# Patient Record
Sex: FEMALE | Race: WHITE
[De-identification: names, ages, dates, MRNs, and addresses within clinical notes are randomized per-mention and may not be internally consistent; named-entity substitution may affect disease eponyms.]

---

## 2020-01-10 ENCOUNTER — APPOINTMENT (OUTPATIENT)
Dept: FAMILY MEDICINE | Facility: CLINIC | Age: 65
End: 2020-01-10

## 2023-03-14 PROBLEM — Z00.00 ENCOUNTER FOR PREVENTIVE HEALTH EXAMINATION: Status: ACTIVE | Noted: 2023-03-14

## 2023-03-31 ENCOUNTER — APPOINTMENT (OUTPATIENT)
Dept: ORTHOPEDIC SURGERY | Facility: CLINIC | Age: 68
End: 2023-03-31
Payer: MEDICARE

## 2023-03-31 VITALS — HEIGHT: 63 IN | WEIGHT: 140 LBS | BODY MASS INDEX: 24.8 KG/M2

## 2023-03-31 DIAGNOSIS — Z78.9 OTHER SPECIFIED HEALTH STATUS: ICD-10-CM

## 2023-03-31 PROCEDURE — 99214 OFFICE O/P EST MOD 30 MIN: CPT

## 2023-03-31 NOTE — DISCUSSION/SUMMARY
[de-identified] : In regard to cervical spine:\par I am requesting an updated cervical spine MRI to evaluate for spinal cord compression, patient displaying signs consistent with myelopathy, last study is approx 1.5 yrs old. Discussed potential acdf.\par \par In regards to lumbar spine: \par Discussed and reviewed results of MRI study demonstrating spondylolisthesis L4/5 and moderate stenosis. Limited in terms of medications due to GI upset. Patient was provided with a referral for lumbar and LT knee physical therapy to work on stretching, strengthening and range of motion. I am referring the patient to pain management to discuss trying Lumbar Epidural Spine Injections for pain relief. \par \par Follow up after Cervical MRI is complete. \par \par Prior to appointment and during encounter with patient extensive medical records were reviewed including but not limited to, hospital records, outpatient records, imaging results, and lab data.During this appointment the patient was examined, diagnoses were discussed and explained in a face to face manner. In addition extensive time was spent reviewing aforementioned diagnostic studies. Counseling including abnormal image results, differential diagnoses, treatment options, risk and benefits, lifestyle changes, current condition, and current medications was performed. Patient's comments, questions, and concerns were addressed and patient verbalized understanding. Based on this patient's presentation at our office, which is an orthopedic spine surgeon's office, this patient inherently / intrinsically has a risk, however minute, of developing issues such as Cauda equina syndrome, bowel and bladder changes, or progression of motor or neurological deficits such as paralysis which may be permanent.\par \par NATTY FOX Acting as a Scribe for Dr. Bustamante\par I, Natty Fox, attest that this documentation has been prepared under the direction and in the presence of Provider Jerald Bustamante MD.

## 2023-03-31 NOTE — HISTORY OF PRESENT ILLNESS
[de-identified] : 3/31/23: The patient is a  67 year old, R dominant hand female who presents today for an eval of C spine, s/p ACDF C5-7 2015. Overall she has been doing well and relatively asymptomatic until 1 year ago. She reports onset of left UE radic starting 1 yr ago, no trauma or injury. She is not using nsaids due to GI distress. No right sided upper extremity symptoms, or change in UE strength. She reports progressive instability ( 2 falls). Dexterity is normal. Severity of pain not as severe compared to pre op, but states it has been progressive. \par Lumbar - also complaints of constant LLE radic present daily. She presents with MRI study from [facility]. Severity ranges. Pain is worse at night. \par \par \par  [FreeTextEntry5] : complaining of left neck pain\par Date of Injury/Onset: ~ 1 year ago\par Pain:    At Rest: 0/10  \par With Activity:  5/10  \par Mechanism of injury: Had C spine surgery back in 2015, felt well all this long \par Quality of symptoms: Sharp with certain movements,  uncomfortable, numbness, radiates down into the arms and hands\par This is NOT a Worker's Compensation Injury\par Improves with: None\par Worse with: Moving the neck forwarded down, laying down\par Prior treatment/ Imaging: MRI @ [facility]\par Out of work: ;Since: \par School/Sport/Position/Occupation: Retired\par Additional Information: 2 weeks ago had left knee Arthroscopic surgery [facility] in [facility].

## 2023-03-31 NOTE — DATA REVIEWED
[Lumbar Spine] : lumbar spine [MRI] : MRI [Cervical Spine] : cervical spine [Report was reviewed and noted in the chart] : The report was reviewed and noted in the chart [I independently reviewed and interpreted images and report] : I independently reviewed and interpreted images and report [I reviewed the films/CD and additionally noted] : I reviewed the films/CD and additionally noted [FreeTextEntry2] : 2021 Post op changes C5-7. Broad based hnp/stenosis C4-5 [FreeTextEntry1] : I stop paperwork reviewed

## 2023-03-31 NOTE — PHYSICAL EXAM
[NL (90)] : forward flexion 90 degrees [NL (30)] : right lateral rotation 30 degrees [NL (45)] : extension 45 degrees [NL (40)] : right lateral bending 40 degrees [5___] : right extensor hallicus longus 5[unfilled]/5 [de-identified] : Constitutional:\par - General Appearance:\par Unremarkable\par Body Habitus\par Well Developed\par Well Nourished\par Body Habitus\par No Deformities\par Well Groomed\par Ability To communicate:\par Normal\par Neurologic:\par Global sensation is intact to upper and lower extremities. See examination of Neck and/or Spine\par for exceptions.\par Orientation to Time, Place and Person is: Normal\par Mood And Affect is Normal\par Skin:\par - Head/Face, Right Upper/Lower Extremity, Left Upper/Lower Extremity: Normal\par See Examination of Neck and/or Spine for exceptions\par Cardiovascular:\par Peripheral Cardiovascular System is Normal\par Palpation of Lymph Nodes:\par Normal Palpation of lymph nodes in: Axilla, Cervical, Inguinal\par Abnormal Palpation of lymph nodes in: None  [] : non-antalgic

## 2023-04-03 ENCOUNTER — RESULT REVIEW (OUTPATIENT)
Age: 68
End: 2023-04-03

## 2023-04-21 ENCOUNTER — APPOINTMENT (OUTPATIENT)
Dept: ORTHOPEDIC SURGERY | Facility: CLINIC | Age: 68
End: 2023-04-21
Payer: MEDICARE

## 2023-04-21 VITALS — BODY MASS INDEX: 24.8 KG/M2 | WEIGHT: 140 LBS | HEIGHT: 63 IN

## 2023-04-21 PROCEDURE — 99214 OFFICE O/P EST MOD 30 MIN: CPT

## 2023-04-21 NOTE — DATA REVIEWED
[Lumbar Spine] : lumbar spine [FreeTextEntry2] : 2021 Post op changes C5-7. Broad based hnp/stenosis C4-5 [MRI] : MRI [Cervical Spine] : cervical spine [Report was reviewed and noted in the chart] : The report was reviewed and noted in the chart [I independently reviewed and interpreted images and report] : I independently reviewed and interpreted images and report [I reviewed the films/CD and additionally noted] : I reviewed the films/CD and additionally noted [FreeTextEntry1] : I stop paperwork reviewed

## 2023-04-21 NOTE — DISCUSSION/SUMMARY
[de-identified] : In regard to cervical spine:\par We discussed and reviewed MRI from 4/3/23 and pathology of her symptoms associated with C4/5 moderate/severe stenosis. Discussed possible interventional spine injections vs surgical decompression, however, we have mutually decided to continue conservative management at this time. Patient will follow up with Dr. Wren to discuss trying C4/5 MADI Spine Injection for pain relief - referral provided. Continue medical mgmt and physician superviused exercise based rehabilitation.\par \par In regard to lumbar spine:\par Patient was provided with a referral for lumbar physical therapy to work on stretching, strengthening and range of motion. Patient was provided with a lumbar home exercise program. Possible injections if no improvements from PT. \par \par Prior to appointment and during encounter with patient extensive medical records were reviewed including but not limited to, hospital records, outpatient records, imaging results, and lab data.During this appointment the patient was examined, diagnoses were discussed and explained in a face to face manner. In addition extensive time was spent reviewing aforementioned diagnostic studies. Counseling including abnormal image results, differential diagnoses, treatment options, risk and benefits, lifestyle changes, current condition, and current medications was performed. Patient's comments, questions, and concerns were addressed and patient verbalized understanding. Based on this patient's presentation at our office, which is an orthopedic spine surgeon's office, this patient inherently / intrinsically has a risk, however minute, of developing issues such as Cauda equina syndrome, bowel and bladder changes, or progression of motor or neurological deficits such as paralysis which may be permanent.\par \par NATTY FOX Acting as a Scribe for Dr. Bustamante\rico I, Natty Fox, attest that this documentation has been prepared under the direction and in the presence of Provider Jerald Bustamante MD.

## 2023-04-21 NOTE — PHYSICAL EXAM
[NL (90)] : forward flexion 90 degrees [NL (30)] : right lateral rotation 30 degrees [NL (45)] : extension 45 degrees [NL (40)] : right lateral bending 40 degrees [5___] : right extensor hallicus longus 5[unfilled]/5 [] : non-antalgic [de-identified] : Constitutional:\par - General Appearance:\par Unremarkable\par Body Habitus\par Well Developed\par Well Nourished\par Body Habitus\par No Deformities\par Well Groomed\par Ability To communicate:\par Normal\par Neurologic:\par Global sensation is intact to upper and lower extremities. See examination of Neck and/or Spine\par for exceptions.\par Orientation to Time, Place and Person is: Normal\par Mood And Affect is Normal\par Skin:\par - Head/Face, Right Upper/Lower Extremity, Left Upper/Lower Extremity: Normal\par See Examination of Neck and/or Spine for exceptions\par Cardiovascular:\par Peripheral Cardiovascular System is Normal\par Palpation of Lymph Nodes:\par Normal Palpation of lymph nodes in: Axilla, Cervical, Inguinal\par Abnormal Palpation of lymph nodes in: None

## 2023-04-21 NOTE — HISTORY OF PRESENT ILLNESS
[de-identified] : 4/21/23: Patient presenting for an MRI review of cervical spine. Patient reports severity of her pain is worse since last visit. Severity of her pain ranges day to day. Chief complaint is radicular UE pain, but states severity of pain not as significant compared to pre op in 2015. No neurologic change since last visit. \par Lumbar - Also complains of persistent low back pain. Intermittent pain extending throughout the LLE, most prominent at nighttime. \par \par Pain at Rest: 4/10\par Pain w/ activity: 9/10\par \par 3/31/23: The patient is a  67 year old, R dominant hand female who presents today for an eval of C spine, s/p ACDF C5-7 2015. Overall she has been doing well and relatively asymptomatic until 1 year ago. She reports onset of left UE radic starting 1 yr ago, no trauma or injury. She is not using nsaids due to GI distress. No right sided upper extremity symptoms, or change in UE strength. She reports progressive instability ( 2 falls). Dexterity is normal. Severity of pain not as severe compared to pre op, but states it has been progressive. \par Lumbar - also complaints of constant LLE radic present daily. She presents with MRI study from [facility]. Severity ranges. Pain is worse at night. \par \par \par  [FreeTextEntry5] : complaining of left neck pain\par Date of Injury/Onset: ~ 1 year ago\par Pain:    At Rest: 0/10  \par With Activity:  5/10  \par Mechanism of injury: Had C spine surgery back in 2015, felt well all this long \par Quality of symptoms: Sharp with certain movements,  uncomfortable, numbness, radiates down into the arms and hands\par This is NOT a Worker's Compensation Injury\par Improves with: None\par Worse with: Moving the neck forwarded down, laying down\par Prior treatment/ Imaging: MRI @ [facility]\par Out of work: ;Since: \par School/Sport/Position/Occupation: Retired\par Additional Information: 2 weeks ago had left knee Arthroscopic surgery [facility] in [facility].

## 2023-05-19 ENCOUNTER — APPOINTMENT (OUTPATIENT)
Dept: ORTHOPEDIC SURGERY | Facility: CLINIC | Age: 68
End: 2023-05-19
Payer: MEDICARE

## 2023-05-19 VITALS — WEIGHT: 140 LBS | BODY MASS INDEX: 24.8 KG/M2 | HEIGHT: 63 IN

## 2023-05-19 PROCEDURE — 99214 OFFICE O/P EST MOD 30 MIN: CPT

## 2023-05-20 NOTE — PHYSICAL EXAM
[Flexion] : flexion [Extension] : extension [Normal Coordination] : normal coordination [Normal DTR UE/LE] : normal DTR UE/LE  [Normal Sensation] : normal sensation [Normal Mood and Affect] : normal mood and affect [Able to Communicate] : able to communicate [Orientated] : orientated [Normal Skin] : normal skin [No Rash] : no rash [No Ulcers] : no ulcers [No Lesions] : no lesions [No obvious lymphadenopathy in areas examined] : no obvious lymphadenopathy in areas examined [Well Developed] : well developed [Peripheral vascular exam is grossly normal] : peripheral vascular exam is grossly normal [No Respiratory Distress] : no respiratory distress [de-identified] : Constitutional:\par - General Appearance:\par Unremarkable\par Body Habitus\par Well Developed\par Well Nourished\par Body Habitus\par No Deformities\par Well Groomed\par Ability To communicate:\par Normal\par Neurologic:\par Global sensation is intact to upper and lower extremities. See examination of Neck and/or Spine\par for exceptions.\par Orientation to Time, Place and Person is: Normal\par Mood And Affect is Normal\par Skin:\par - Head/Face, Right Upper/Lower Extremity, Left Upper/Lower Extremity: Normal\par See Examination of Neck and/or Spine for exceptions\par Cardiovascular:\par Peripheral Cardiovascular System is Normal\par Palpation of Lymph Nodes:\par Normal Palpation of lymph nodes in: Axilla, Cervical, Inguinal\par Abnormal Palpation of lymph nodes in: None  [] : negative contralateral straight leg raise

## 2023-05-20 NOTE — DISCUSSION/SUMMARY
[de-identified] : In regard to cervical spine:\par We discussed and reviewed MRI from 4/3/23  last visit and pathology of her symptoms associated with C4/5 moderate/severe stenosis. Discussed possible interventional spine injections vs surgical decompression, however, we have mutually decided to continue conservative management at this time. Patient will follow up with Dr. Wren to discuss trying C4/5 MADI Spine Injection for pain relief - referral provided. Continue medical mgmt and physician supervised exercise based rehabilitation.\par \par In regard to lumbar spine:\par Patient was provided with a referral for lumbar physical therapy to work on stretching, strengthening and range of motion. Patient was provided with a lumbar home exercise program.  Would recommend interventional therapy at this time given patients pain has not improved to satisfaction with PT.  \par \par Referral for left knee management With Dr. Rubio or Dr. Reynolds given today.  She is s/p left knee meniscus repair in March. \par \par Prior to appointment and during encounter with patient extensive medical records were reviewed including but not limited to, hospital records, outpatient records, imaging results, and lab data.During this appointment the patient was examined, diagnoses were discussed and explained in a face to face manner. In addition extensive time was spent reviewing aforementioned diagnostic studies. Counseling including abnormal image results, differential diagnoses, treatment options, risk and benefits, lifestyle changes, current condition, and current medications was performed. Patient's comments, questions, and concerns were addressed and patient verbalized understanding. Based on this patient's presentation at our office, which is an orthopedic spine surgeon's office, this patient inherently / intrinsically has a risk, however minute, of developing issues such as Cauda equina syndrome, bowel and bladder changes, or progression of motor or neurological deficits such as paralysis which may be permanent.\par \par NATTY FOX Acting as a Scribe for Dr. Trevor DELAROSA, Natty Fox, attest that this documentation has been prepared under the direction and in the presence of Provider Jerald Bustamante MD.

## 2023-05-20 NOTE — HISTORY OF PRESENT ILLNESS
[de-identified] : 5/19/23: The patient is a 67 year female  who presents today follow up cervical spine and lumbar spine.  Patient reports improved cervical radicular pain in to her left arm and mild left upper trap pain but overall improved with PT at this point.  Her prdominant complaint is lower back worse with walking.  No radicular pain, rather isolated left knee pain in the setting of recent left knee meniscal repair in March 2023. \par Pain:    At Rest: 0/10 \par With Activity:  0/10 \par Treatment: physical therapy\par Change since last visit: working part time\par \par \par 4/21/23: Patient presenting for an MRI review of cervical spine. Patient reports severity of her pain is worse since last visit. Severity of her pain ranges day to day. Chief complaint is radicular UE pain, but states severity of pain not as significant compared to pre op in 2015. No neurologic change since last visit. \par Lumbar - Also complains of persistent low back pain. Intermittent pain extending throughout the LLE, most prominent at nighttime. \par \par Pain at Rest: 4/10\par Pain w/ activity: 9/10\par \par 3/31/23: The patient is a  67 year old, R dominant hand female who presents today for an eval of C spine, s/p ACDF C5-7 2015. Overall she has been doing well and relatively asymptomatic until 1 year ago. She reports onset of left UE radic starting 1 yr ago, no trauma or injury. She is not using nsaids due to GI distress. No right sided upper extremity symptoms, or change in UE strength. She reports progressive instability ( 2 falls). Dexterity is normal. Severity of pain not as severe compared to pre op, but states it has been progressive. \par Lumbar - also complaints of constant LLE radic present daily. She presents with MRI study from [facility]. Severity ranges. Pain is worse at night. \par \par \par  [FreeTextEntry5] : complaining of left neck pain\par Date of Injury/Onset: ~ 1 year ago\par Pain:    At Rest: 0/10  \par With Activity:  5/10  \par Mechanism of injury: Had C spine surgery back in 2015, felt well all this long \par Quality of symptoms: Sharp with certain movements,  uncomfortable, numbness, radiates down into the arms and hands\par This is NOT a Worker's Compensation Injury\par Improves with: None\par Worse with: Moving the neck forwarded down, laying down\par Prior treatment/ Imaging: MRI @ [facility]\par Out of work: ;Since: \par School/Sport/Position/Occupation: Retired\par Additional Information: 2 weeks ago had left knee Arthroscopic surgery [facility] in [facility].

## 2023-05-24 ENCOUNTER — APPOINTMENT (OUTPATIENT)
Dept: ORTHOPEDIC SURGERY | Facility: CLINIC | Age: 68
End: 2023-05-24

## 2023-06-07 ENCOUNTER — APPOINTMENT (OUTPATIENT)
Dept: ORTHOPEDIC SURGERY | Facility: CLINIC | Age: 68
End: 2023-06-07
Payer: MEDICARE

## 2023-06-07 ENCOUNTER — RESULT REVIEW (OUTPATIENT)
Age: 68
End: 2023-06-07

## 2023-06-07 VITALS — BODY MASS INDEX: 24.8 KG/M2 | HEIGHT: 63 IN | WEIGHT: 140 LBS

## 2023-06-07 PROCEDURE — 99214 OFFICE O/P EST MOD 30 MIN: CPT

## 2023-06-07 PROCEDURE — 73564 X-RAY EXAM KNEE 4 OR MORE: CPT | Mod: LT

## 2023-06-07 NOTE — DISCUSSION/SUMMARY
[de-identified] : Reviewed all images with patient.\par [facility] MRI of left knee to eval meniscus retear.\par Follow up after MRI scan.\par \par \par \par \par -----------------------------------------------\par Home Exercise\par The patient is instructed on a home exercise program.\par \par AMBER COLÓN Acting as a Scribe for Dr. Rubio\par I, Amber Colón, attest that this documentation has been prepared under the direction and in the presence of Provider Beto Rubio MD.\par \par Activity Modification\par The patient was advised to modify their activities.\par \par Dx / Natural History\par The patient was advised of the diagnosis.  The natural history of the pathology was explained in full to the patient in layman's terms.  Several different treatment options were discussed and explained in full to the patient including the risks and benefits of both surgical and non-surgical treatments.  All questions and concerns were answered.\par \par Pain Guide Activities\par The patient was advised to let pain guide the gradual advancement of activities.\par \par RICE\par I explained to the patient that rest, ice, compression, and elevation would benefit them.  They may return to activity after follow-up or when they no longer have any pain.\par \par The patient's current medication management of their orthopedic diagnosis was reviewed today:\par (1) We discussed a comprehensive treatment plan that included possible pharmaceutical management involving the use of prescription strength medications including but not limited to options such as oral Naprosyn 500mg BID, once daily Meloxicam 15 mg, or 500-650 mg Tylenol versus over the counter oral medications and topical prescription NSAID Pennsaid vs over the counter Voltaren gel.\par (2) There is a moderate risk of morbidity with further treatment, especially from use of prescription strength medications and possible side effects of these medications which include upset stomach with oral medications, skin reactions to topical medications and cardiac/renal issues with long term use.\par (3) I recommended that the patient follow-up with their medical physician to discuss any significant specific potential issues with long term medication use such as interactions with current medications or with exacerbation of underlying medical comorbidities.\par (4) The benefits and risks associated with use of injectable, oral or topical, prescription and over the counter anti-inflammatory medications were discussed with the patient. The patient voiced understanding of the risks including but not limited to bleeding, stroke, kidney dysfunction, heart disease, and were referred to the black box warning label for further information.

## 2023-06-07 NOTE — HISTORY OF PRESENT ILLNESS
[de-identified] : The patient is a 68 year old R hand dominant female who presents today complaining of L knee pain.   \par Date of Injury/Onset: ~11/22 \par Pain:    At Rest: 0/10  \par With Activity:  9/10  \par Mechanism of injury: Patient noted acute onset pain after falling.\par This is not a Work Related Injury being treated under Worker's Compensation. \par This is not an athletic injury occurring associated with an interscholastic or organized sports team. \par Quality of symptoms: sharp, locking   \par Improves with: N/A\par Worse with: walking, stretching\par Prior treatment: Surgery [facility]; Dr. Kay Meniscus repair - 03/23\par Prior Imaging: MRI ([facility]); Surgical photos\par Out of work/sport: _, since _ \par School/Sport/Position/Occupation: Part-time; \par Additional Information: Patient denied performing PT after surgery secondary to pain, swelling and ecchymosis\par

## 2023-06-07 NOTE — PHYSICAL EXAM
[de-identified] : Neurovascularly intact distally \par \par Left Knee: \par medial joint line tenderness  \par +Medial Jimmie's\par +Locking\par Full ROM\par Pain with full extension\par Medial buckling\par

## 2023-06-14 ENCOUNTER — APPOINTMENT (OUTPATIENT)
Dept: ORTHOPEDIC SURGERY | Facility: CLINIC | Age: 68
End: 2023-06-14
Payer: MEDICARE

## 2023-06-14 VITALS — BODY MASS INDEX: 24.8 KG/M2 | WEIGHT: 140 LBS | HEIGHT: 63 IN

## 2023-06-14 PROCEDURE — 99214 OFFICE O/P EST MOD 30 MIN: CPT

## 2023-06-14 NOTE — HISTORY OF PRESENT ILLNESS
[de-identified] : The patient is a 68 year old R hand dominant female who presents today complaining of L knee pain.   \par Date of Injury/Onset: ~11/22 \par Pain:    At Rest: 0/10  \par With Activity:  9/10  \par Mechanism of injury: Patient noted acute onset pain after falling.\par This is not a Work Related Injury being treated under Worker's Compensation. \par This is not an athletic injury occurring associated with an interscholastic or organized sports team. \par Quality of symptoms: sharp, locking   \par Improves with: N/A\par Worse with: walking, stretching\par Prior treatment: Surgery [facility]; Dr. Kay Meniscus repair - 03/23\par Prior Imaging: MRI ([facility]); Surgical photos\par Out of work/sport: _, since _ \par School/Sport/Position/Occupation: Part-time; \par Additional Information: Patient denied performing PT after surgery secondary to pain, swelling and ecchymosis\par

## 2023-06-14 NOTE — PHYSICAL EXAM
[de-identified] : Neurovascularly intact distally \par \par Left Knee: \par medial joint line tenderness  \par +Medial Jimmie's\par +Locking\par Full ROM\par Pain with full extension\par Medial buckling\par

## 2023-06-14 NOTE — DISCUSSION/SUMMARY
[de-identified] : recommended meniscal root repair surgery\par given uncertainty of when root tear occurred I recommended these be addressed within 2-3 months of the injury\par reached out to Dr. Farris for MRI report addendum\par pt will return with  to discuss further\par \par \par Vacation on July 26th- 8 to 10 days.\par -----------------------------------------------\par Home Exercise\par The patient is instructed on a home exercise program.\par \par AMBER COLÓN Acting as a Scribe for Dr. Rubio\par I, Amber Colón, attest that this documentation has been prepared under the direction and in the presence of Provider Beto Rubio MD.\par \par Activity Modification\par The patient was advised to modify their activities.\par \par Dx / Natural History\par The patient was advised of the diagnosis.  The natural history of the pathology was explained in full to the patient in layman's terms.  Several different treatment options were discussed and explained in full to the patient including the risks and benefits of both surgical and non-surgical treatments.  All questions and concerns were answered.\par \par Pain Guide Activities\par The patient was advised to let pain guide the gradual advancement of activities.\par \par RICE\par I explained to the patient that rest, ice, compression, and elevation would benefit them.  They may return to activity after follow-up or when they no longer have any pain.\par \par The patient's current medication management of their orthopedic diagnosis was reviewed today:\par (1) We discussed a comprehensive treatment plan that included possible pharmaceutical management involving the use of prescription strength medications including but not limited to options such as oral Naprosyn 500mg BID, once daily Meloxicam 15 mg, or 500-650 mg Tylenol versus over the counter oral medications and topical prescription NSAID Pennsaid vs over the counter Voltaren gel.\par (2) There is a moderate risk of morbidity with further treatment, especially from use of prescription strength medications and possible side effects of these medications which include upset stomach with oral medications, skin reactions to topical medications and cardiac/renal issues with long term use.\par (3) I recommended that the patient follow-up with their medical physician to discuss any significant specific potential issues with long term medication use such as interactions with current medications or with exacerbation of underlying medical comorbidities.\par (4) The benefits and risks associated with use of injectable, oral or topical, prescription and over the counter anti-inflammatory medications were discussed with the patient. The patient voiced understanding of the risks including but not limited to bleeding, stroke, kidney dysfunction, heart disease, and were referred to the black box warning label for further information.

## 2023-06-14 NOTE — DATA REVIEWED
[FreeTextEntry1] : 6/7/23\par OC X RAY Left Knee: 4 view:\par unremarkable\par \par 6/7/23\par [facility] MRI Left Knee\par IMPRESSION:\par * Interval partial medial meniscectomy with new horizontal tear of the body\par segment.\par * Minimal medial bursitis.\par * Stable cartilage wear as described.\par * Small knee joint effusion.\par

## 2023-06-21 ENCOUNTER — APPOINTMENT (OUTPATIENT)
Dept: ORTHOPEDIC SURGERY | Facility: CLINIC | Age: 68
End: 2023-06-21
Payer: MEDICARE

## 2023-06-21 VITALS — HEIGHT: 63 IN | WEIGHT: 140 LBS | BODY MASS INDEX: 24.8 KG/M2

## 2023-06-21 DIAGNOSIS — M25.562 PAIN IN LEFT KNEE: ICD-10-CM

## 2023-06-21 DIAGNOSIS — G89.29 PAIN IN LEFT KNEE: ICD-10-CM

## 2023-06-21 PROCEDURE — L2397: CPT | Mod: LT,KX

## 2023-06-21 PROCEDURE — L1833: CPT | Mod: KV,LT,KX

## 2023-06-21 PROCEDURE — 99214 OFFICE O/P EST MOD 30 MIN: CPT

## 2023-06-21 NOTE — DATA REVIEWED
[FreeTextEntry1] : 6/7/23\par OC X RAY Left Knee: 4 view:\par unremarkable\par \par 6/7/23\par [facility] MRI Left Knee\par IMPRESSION:\par * Interval partial medial meniscectomy with new horizontal tear of the body\par segment.\par * Minimal medial bursitis.\par * Stable cartilage wear as described.\par * Small knee joint effusion.\par \par 6/16/23\par [facility] MRI Left Knee\par Impression Reread:\par In the interim since the prior study patient status post previous partial medial\par meniscectomy. There is a high-grade recurrent tear at the posterior root\par attachment of the medial meniscus and horizontal tearing of the posterior aspect\par of the meniscal body.\par \par Mild cartilage loss in the lateral and patellofemoral compartments, unchanged.\par \par Moderate joint effusion.\par

## 2023-06-21 NOTE — DISCUSSION/SUMMARY
[de-identified] : Discussed treatment options, the patient would like to follow through with root repair surgery.\par Explained the limitations and the circumstances of after surgery.\par Medical note provided for travel agency. \par \par \par  \par \par \par Surgical Consent:\par \par -Conservative treatment, nontreatment, nonsurgical intervention and surgical intervention treatment options have been reviewed with the patient.  The patient continues to be symptomatic [and has failed conservative treatment], and elects to move forward with surgical intervention.  The patient is indicated for LEFT KNEE ARTHROSCOPIC MEDIAL MENISCUS ROOT REPAIR and all indicated procedures. As such the alternatives, benefits and risks, of the above procedure, including but not limited to bleeding, infection, neurovascular injury, loss of limb, loss of life,  DVT, PE, RSD, inability to return to previous level of activity, inability to return to previous level of employment, advancement of or to osteoarthritic changes, joint instability or motion loss, hardware failure or migration, failure to resolve all symptoms, failure to return to sports and need for further procedures, as well as specific risk of RE-TEAR AND OA were discussed with the patient and/or their legal guardian who agreed to move forward with surgical intervention.  They have reviewed and signed the consent form today after expressing understanding of the above documented conversation. The patient or their representative will contact my office as instructed on the preoperative instruction sheet they received today to schedule surgery in a timely manner as discussed.\par \par -As a post-operative protocol, I am prescribing an iceless cold/heat compression therapy device for at home use to be used 3-5 times per day at 40 degrees for 35 days as an alternative to pain medication. I would like my patient to begin with simultaneous cold & compression therapy at 10mm pressure. At the patients follow up I will determine whether they should continue with cold, or if they should transition to contrast cold/heat compression therapy. Unlike a conventional cold therapy unit that requires ice, the ThermX iceless device is set to a prescribed temperature that it will remain throughout the entire duration of use, whether that be cold compression, heat compression, or contrast compression. Cold therapy units that depend on ice melt over a very short period and do not provide compression which limits the compliance and effectiveness for pain/inflammation reduction that I am targeting for my patient. I have reached out to [email or web address] [facility] to supply this device as they are the exclusive provider of the ThermX and the patient will be contacted and instructed on how to utilize the device.\par ------------ \par \par \par \par \par Vacation on July 26th- 8 to 10 days.\par -----------------------------------------------\par Home Exercise\par The patient is instructed on a home exercise program.\par \par AMBER COLÓN Acting as a Scribe for Dr. Rubio\par I, Amber Colón, attest that this documentation has been prepared under the direction and in the presence of Provider Beto Rubio MD.\par \par Activity Modification\par The patient was advised to modify their activities.\par \par Dx / Natural History\par The patient was advised of the diagnosis.  The natural history of the pathology was explained in full to the patient in layman's terms.  Several different treatment options were discussed and explained in full to the patient including the risks and benefits of both surgical and non-surgical treatments.  All questions and concerns were answered.\par \par Pain Guide Activities\par The patient was advised to let pain guide the gradual advancement of activities.\par \par RICE\par I explained to the patient that rest, ice, compression, and elevation would benefit them.  They may return to activity after follow-up or when they no longer have any pain.\par \par The patient's current medication management of their orthopedic diagnosis was reviewed today:\par (1) We discussed a comprehensive treatment plan that included possible pharmaceutical management involving the use of prescription strength medications including but not limited to options such as oral Naprosyn 500mg BID, once daily Meloxicam 15 mg, or 500-650 mg Tylenol versus over the counter oral medications and topical prescription NSAID Pennsaid vs over the counter Voltaren gel.\par (2) There is a moderate risk of morbidity with further treatment, especially from use of prescription strength medications and possible side effects of these medications which include upset stomach with oral medications, skin reactions to topical medications and cardiac/renal issues with long term use.\par (3) I recommended that the patient follow-up with their medical physician to discuss any significant specific potential issues with long term medication use such as interactions with current medications or with exacerbation of underlying medical comorbidities.\par (4) The benefits and risks associated with use of injectable, oral or topical, prescription and over the counter anti-inflammatory medications were discussed with the patient. The patient voiced understanding of the risks including but not limited to bleeding, stroke, kidney dysfunction, heart disease, and were referred to the black box warning label for further information.

## 2023-06-21 NOTE — HISTORY OF PRESENT ILLNESS
[de-identified] : The patient is a 68 year old R hand dominant female who presents today complaining of L knee pain.   \par Date of Injury/Onset: ~11/22 \par Pain:    At Rest: 0/10  \par With Activity:  9/10  \par Mechanism of injury: Patient noted acute onset pain after falling.\par This is not a Work Related Injury being treated under Worker's Compensation. \par This is not an athletic injury occurring associated with an interscholastic or organized sports team. \par Quality of symptoms: sharp, locking   \par Improves with: N/A\par Worse with: walking, stretching\par Prior treatment: Surgery [facility]; Dr. Kay Meniscus repair - 03/23\par Prior Imaging: MRI ([facility]); Surgical photos\par Out of work/sport: _, since _ \par School/Sport/Position/Occupation: Part-time; \par Additional Information: Patient denied performing PT after surgery secondary to pain, swelling and ecchymosis\par

## 2023-06-21 NOTE — PHYSICAL EXAM
[de-identified] : Neurovascularly intact distally \par \par Left Knee: \par medial joint line tenderness  \par +Medial Jimmie's\par +Locking\par Full ROM\par Pain with full extension\par Medial buckling\par

## 2023-06-30 ENCOUNTER — APPOINTMENT (OUTPATIENT)
Dept: ORTHOPEDIC SURGERY | Facility: CLINIC | Age: 68
End: 2023-06-30

## 2023-07-13 ENCOUNTER — APPOINTMENT (OUTPATIENT)
Dept: ORTHOPEDIC SURGERY | Facility: AMBULATORY SURGERY CENTER | Age: 68
End: 2023-07-13

## 2023-07-24 ENCOUNTER — APPOINTMENT (OUTPATIENT)
Dept: ORTHOPEDIC SURGERY | Facility: CLINIC | Age: 68
End: 2023-07-24

## 2023-11-01 ENCOUNTER — APPOINTMENT (OUTPATIENT)
Dept: ORTHOPEDIC SURGERY | Facility: CLINIC | Age: 68
End: 2023-11-01
Payer: MEDICARE

## 2023-11-01 VITALS — BODY MASS INDEX: 24.8 KG/M2 | HEIGHT: 63 IN | WEIGHT: 140 LBS

## 2023-11-01 DIAGNOSIS — M77.8 OTHER ENTHESOPATHIES, NOT ELSEWHERE CLASSIFIED: ICD-10-CM

## 2023-11-01 PROCEDURE — 99214 OFFICE O/P EST MOD 30 MIN: CPT

## 2023-11-28 ENCOUNTER — OFFICE (OUTPATIENT)
Dept: URBAN - METROPOLITAN AREA CLINIC 12 | Facility: CLINIC | Age: 68
Setting detail: OPHTHALMOLOGY
End: 2023-11-28
Payer: MEDICARE

## 2023-11-28 DIAGNOSIS — H43.393: ICD-10-CM

## 2023-11-28 DIAGNOSIS — H04.123: ICD-10-CM

## 2023-11-28 DIAGNOSIS — H25.13: ICD-10-CM

## 2023-11-28 DIAGNOSIS — H35.363: ICD-10-CM

## 2023-11-28 DIAGNOSIS — H35.372: ICD-10-CM

## 2023-11-28 PROCEDURE — 92134 CPTRZ OPH DX IMG PST SGM RTA: CPT | Performed by: OPHTHALMOLOGY

## 2023-11-28 PROCEDURE — 92014 COMPRE OPH EXAM EST PT 1/>: CPT | Performed by: OPHTHALMOLOGY

## 2023-11-28 ASSESSMENT — REFRACTION_MANIFEST
OS_AXIS: 110
OD_CYLINDER: -0.75
OS_SPHERE: -0.75
OD_VA1: 20/20-
OS_VA1: 20/20-2
OD_AXIS: 080
OS_CYLINDER: -0.75
OU_VA: 20/20
OD_SPHERE: -0.75

## 2023-11-28 ASSESSMENT — REFRACTION_CURRENTRX
OS_OVR_VA: 20/
OS_VPRISM_DIRECTION: PROGS
OD_OVR_VA: 20/
OS_CYLINDER: -0.75
OS_SPHERE: -1.00
OD_SPHERE: -1.25
OS_AXIS: 107
OD_CYLINDER: -0.75
OD_AXIS: 73
OS_ADD: +3.50
OD_VPRISM_DIRECTION: PROGS
OD_ADD: +3.50

## 2023-11-28 ASSESSMENT — CONFRONTATIONAL VISUAL FIELD TEST (CVF)
OD_FINDINGS: FULL
OS_FINDINGS: FULL

## 2023-11-28 ASSESSMENT — REFRACTION_AUTOREFRACTION
OD_SPHERE: -0.50
OD_AXIS: 076
OS_CYLINDER: -1.00
OS_AXIS: 109
OS_SPHERE: -0.50
OD_CYLINDER: -1.25

## 2023-11-28 ASSESSMENT — SPHEQUIV_DERIVED
OD_SPHEQUIV: -1.125
OS_SPHEQUIV: -1.125
OS_SPHEQUIV: -1
OD_SPHEQUIV: -1.125

## 2024-02-07 ENCOUNTER — APPOINTMENT (OUTPATIENT)
Dept: ORTHOPEDIC SURGERY | Facility: CLINIC | Age: 69
End: 2024-02-07
Payer: MEDICARE

## 2024-02-07 VITALS — BODY MASS INDEX: 24.8 KG/M2 | HEIGHT: 63 IN | WEIGHT: 140 LBS

## 2024-02-07 PROCEDURE — 99214 OFFICE O/P EST MOD 30 MIN: CPT

## 2024-02-08 NOTE — DATA REVIEWED
[MRI] : MRI [Cervical Spine] : cervical spine [Report was reviewed and noted in the chart] : The report was reviewed and noted in the chart [I independently reviewed and interpreted images and report] : I independently reviewed and interpreted images and report [I reviewed the films/CD and additionally noted] : I reviewed the films/CD and additionally noted [FreeTextEntry1] : I stop paperwork reviewed Orthopedic progress notes reviewed

## 2024-02-08 NOTE — PHYSICAL EXAM
[Normal Coordination] : normal coordination [Normal DTR UE/LE] : normal DTR UE/LE  [Normal Sensation] : normal sensation [Normal Mood and Affect] : normal mood and affect [Orientated] : orientated [Able to Communicate] : able to communicate [Normal Skin] : normal skin [No Rash] : no rash [No Ulcers] : no ulcers [No Lesions] : no lesions [No obvious lymphadenopathy in areas examined] : no obvious lymphadenopathy in areas examined [Well Developed] : well developed [Peripheral vascular exam is grossly normal] : peripheral vascular exam is grossly normal [No Respiratory Distress] : no respiratory distress [Flexion] : flexion [Extension] : extension [de-identified] : Constitutional:\par  - General Appearance:\par  Unremarkable\par  Body Habitus\par  Well Developed\par  Well Nourished\par  Body Habitus\par  No Deformities\par  Well Groomed\par  Ability To communicate:\par  Normal\par  Neurologic:\par  Global sensation is intact to upper and lower extremities. See examination of Neck and/or Spine\par  for exceptions.\par  Orientation to Time, Place and Person is: Normal\par  Mood And Affect is Normal\par  Skin:\par  - Head/Face, Right Upper/Lower Extremity, Left Upper/Lower Extremity: Normal\par  See Examination of Neck and/or Spine for exceptions\par  Cardiovascular:\par  Peripheral Cardiovascular System is Normal\par  Palpation of Lymph Nodes:\par  Normal Palpation of lymph nodes in: Axilla, Cervical, Inguinal\par  Abnormal Palpation of lymph nodes in: None  [] : negative contralateral straight leg raise

## 2024-02-08 NOTE — HISTORY OF PRESENT ILLNESS
[de-identified] : 02/07/2024 - Return to the office for follow up. she was last seen in May 2023. She continues to have intermittent cervical pain primarily on the posterior aspect with radiation into the shoulders and upper trapezium muscles bilaterally. She does not report numbness, tingling or weakness into the arms. She did not move ahead with the cervical epidural injection as previously discussed. Lower back pain is stable. She has undergone so left knee surgery with Dr. Lui at [facility] and this is taking some time to improve but she does report progress with the left knee.  5/19/23: The patient is a 67 year female  who presents today follow up cervical spine and lumbar spine.  Patient reports improved cervical radicular pain in to her left arm and mild left upper trap pain but overall improved with PT at this point.  Her prdominant complaint is lower back worse with walking.  No radicular pain, rather isolated left knee pain in the setting of recent left knee meniscal repair in March 2023.  Pain:    At Rest: 0/10  With Activity:  0/10  Treatment: physical therapy Change since last visit: working part time  4/21/23: Patient presenting for an MRI review of cervical spine. Patient reports severity of her pain is worse since last visit. Severity of her pain ranges day to day. Chief complaint is radicular UE pain, but states severity of pain not as significant compared to pre op in 2015. No neurologic change since last visit.  Lumbar - Also complains of persistent low back pain. Intermittent pain extending throughout the LLE, most prominent at nighttime.   Pain at Rest: 4/10 Pain w/ activity: 9/10  3/31/23: The patient is a  67 year old, R dominant hand female who presents today for an eval of C spine, s/p ACDF C5-7 2015. Overall she has been doing well and relatively asymptomatic until 1 year ago. She reports onset of left UE radic starting 1 yr ago, no trauma or injury. She is not using nsaids due to GI distress. No right sided upper extremity symptoms, or change in UE strength. She reports progressive instability ( 2 falls). Dexterity is normal. Severity of pain not as severe compared to pre op, but states it has been progressive.  Lumbar - also complaints of constant LLE radic present daily. She presents with MRI study from [facility]. Severity ranges. Pain is worse at night.     [FreeTextEntry5] : complaining of left neck pain\par  Date of Injury/Onset: ~ 1 year ago\par  Pain:    At Rest: 0/10  \par  With Activity:  5/10  \par  Mechanism of injury: Had C spine surgery back in 2015, felt well all this long \par  Quality of symptoms: Sharp with certain movements,  uncomfortable, numbness, radiates down into the arms and hands\par  This is NOT a Worker's Compensation Injury\par  Improves with: None\par  Worse with: Moving the neck forwarded down, laying down\par  Prior treatment/ Imaging: MRI @ [facility]\par  Out of work: ;Since: \par  School/Sport/Position/Occupation: Retired\par  Additional Information: 2 weeks ago had left knee Arthroscopic surgery [facility] in [facility].

## 2024-02-08 NOTE — DISCUSSION/SUMMARY
[de-identified] : Together in the office we discussed lumbar therapy and cervical outpatient physical therapy, which she is amenable to pursue and at this time. She has to work up a recent diagnosis of elevated liver function tests and is hesitant to utilize oral medication at this time. we discussed possible repeat cervical injections if she is refractory to six weeks of outpatient PT. We also briefly discussed surgical intervention (ACDF), but she is not ready to pursue that at this time.   Prior to appointment and during encounter with patient extensive medical records were reviewed including but not limited to, hospital records, outpatient records, imaging results, and lab data.During this appointment the patient was examined, diagnoses were discussed and explained in a face to face manner. In addition extensive time was spent reviewing aforementioned diagnostic studies. Counseling including abnormal image results, differential diagnoses, treatment options, risk and benefits, lifestyle changes, current condition, and current medications was performed. Patient's comments, questions, and concerns were addressed and patient verbalized understanding. Based on this patient's presentation at our office, which is an orthopedic spine surgeon's office, this patient inherently / intrinsically has a risk, however minute, of developing issues such as Cauda equina syndrome, bowel and bladder changes, or progression of motor or neurological deficits such as paralysis which may be permanent.

## 2024-05-08 ENCOUNTER — RESULT REVIEW (OUTPATIENT)
Age: 69
End: 2024-05-08

## 2024-05-08 ENCOUNTER — APPOINTMENT (OUTPATIENT)
Dept: ORTHOPEDIC SURGERY | Facility: CLINIC | Age: 69
End: 2024-05-08
Payer: MEDICARE

## 2024-05-08 VITALS — HEIGHT: 63 IN | BODY MASS INDEX: 24.8 KG/M2 | WEIGHT: 140 LBS

## 2024-05-08 DIAGNOSIS — S89.92XA UNSPECIFIED INJURY OF LEFT LOWER LEG, INITIAL ENCOUNTER: ICD-10-CM

## 2024-05-08 PROCEDURE — 99214 OFFICE O/P EST MOD 30 MIN: CPT

## 2024-05-08 NOTE — HISTORY OF PRESENT ILLNESS
[de-identified] : 05/08/2024 - Patient presenting in follow up. She feels the radicular pain in her right leg has gotten worse. Completed PT for knee and lumbar, sessions no longer authorized, and she transitioned to home exercises states HEP as somewhat helpful but her pain continues to be limited. Finds it very difficult to walk due to her unsuccessful left knee surgery july 17th 2023 with Dr. Lui, and feels her back is aggravated as a result of her gait being off. She does utilize Motrin when back and leg pain is severe, but not routinely due to kidney disease.   02/07/2024 - Return to the office for follow up. she was last seen in May 2023. She continues to have intermittent cervical pain primarily on the posterior aspect with radiation into the shoulders and upper trapezium muscles bilaterally. She does not report numbness, tingling or weakness into the arms. She did not move ahead with the cervical epidural injection as previously discussed. Lower back pain is stable. She has undergone so left knee surgery with Dr. Lui at [place] and this is taking some time to improve but she does report progress with the left knee.  5/19/23: The patient is a 67 year female  who presents today follow up cervical spine and lumbar spine.  Patient reports improved cervical radicular pain in to her left arm and mild left upper trap pain but overall improved with PT at this point.  Her prdominant complaint is lower back worse with walking.  No radicular pain, rather isolated left knee pain in the setting of recent left knee meniscal repair in March 2023.  Pain:    At Rest: 0/10  With Activity:  0/10  Treatment: physical therapy Change since last visit: working part time  4/21/23: Patient presenting for an MRI review of cervical spine. Patient reports severity of her pain is worse since last visit. Severity of her pain ranges day to day. Chief complaint is radicular UE pain, but states severity of pain not as significant compared to pre op in 2015. No neurologic change since last visit.  Lumbar - Also complains of persistent low back pain. Intermittent pain extending throughout the LLE, most prominent at nighttime.   Pain at Rest: 4/10 Pain w/ activity: 9/10  3/31/23: The patient is a  67 year old, R dominant hand female who presents today for an eval of C spine, s/p ACDF C5-7 2015. Overall she has been doing well and relatively asymptomatic until 1 year ago. She reports onset of left UE radic starting 1 yr ago, no trauma or injury. She is not using nsaids due to GI distress. No right sided upper extremity symptoms, or change in UE strength. She reports progressive instability ( 2 falls). Dexterity is normal. Severity of pain not as severe compared to pre op, but states it has been progressive.  Lumbar - also complaints of constant LLE radic present daily. She presents with MRI study from [facility]. Severity ranges. Pain is worse at night.     [FreeTextEntry5] : complaining of left neck pain\par  Date of Injury/Onset: ~ 1 year ago\par  Pain:    At Rest: 0/10  \par  With Activity:  5/10  \par  Mechanism of injury: Had C spine surgery back in 2015, felt well all this long \par  Quality of symptoms: Sharp with certain movements,  uncomfortable, numbness, radiates down into the arms and hands\par  This is NOT a Worker's Compensation Injury\par  Improves with: None\par  Worse with: Moving the neck forwarded down, laying down\par  Prior treatment/ Imaging: MRI @ [facility]\par  Out of work: ;Since: \par  School/Sport/Position/Occupation: Retired\par  Additional Information: 2 weeks ago had left knee Arthroscopic surgery [facility] in [facility].

## 2024-05-08 NOTE — PHYSICAL EXAM
[Normal Coordination] : normal coordination [Normal DTR UE/LE] : normal DTR UE/LE  [Normal Sensation] : normal sensation [Normal Mood and Affect] : normal mood and affect [Oriented] : oriented [Able to Communicate] : able to communicate [Normal Skin] : normal skin [No Rash] : no rash [No Ulcers] : no ulcers [No Lesions] : no lesions [No obvious lymphadenopathy in areas examined] : no obvious lymphadenopathy in areas examined [Well Developed] : well developed [Peripheral vascular exam is grossly normal] : peripheral vascular exam is grossly normal [No Respiratory Distress] : no respiratory distress [Flexion] : flexion [Extension] : extension [de-identified] : Constitutional:\par  - General Appearance:\par  Unremarkable\par  Body Habitus\par  Well Developed\par  Well Nourished\par  Body Habitus\par  No Deformities\par  Well Groomed\par  Ability To communicate:\par  Normal\par  Neurologic:\par  Global sensation is intact to upper and lower extremities. See examination of Neck and/or Spine\par  for exceptions.\par  Orientation to Time, Place and Person is: Normal\par  Mood And Affect is Normal\par  Skin:\par  - Head/Face, Right Upper/Lower Extremity, Left Upper/Lower Extremity: Normal\par  See Examination of Neck and/or Spine for exceptions\par  Cardiovascular:\par  Peripheral Cardiovascular System is Normal\par  Palpation of Lymph Nodes:\par  Normal Palpation of lymph nodes in: Axilla, Cervical, Inguinal\par  Abnormal Palpation of lymph nodes in: None  [] : negative contralateral straight leg raise

## 2024-05-08 NOTE — DISCUSSION/SUMMARY
[de-identified] : The patient was recommended to obtain the operative reports for her left knee and was referred to Dr. Lara for additional assessment concerning her left knee pain. The heightened back and leg pain seems to be exacerbated by an altered gait due to her left knee condition. She will cautiously use Motrin, given her history of kidney disease. An MRI of the lumbar spine has been requested to assess for disc herniation versus stenosis, patient has persistent back and LE radic despite physical therapy and NSAIDs. A follow-up planned after the MRI results are available. She may be a candidate for interventional pain management in terms of epidural steroid injections although this will be determined upon review of the MRI.  Given educational material for neck and back home exercise program.  Cumulative encounter duration exceeded 30 minutes.  We have discussed the risks, benefits, and alternatives NSAID therapy including but not limited to the risk of bleeding, thrombosis, gastric mucosal irritation/ulceration, allergic reaction and kidney dysfunction; the patient verbalizes an understanding.   Prior to appointment and during encounter with patient extensive medical records were reviewed including but not limited to, hospital records, outpatient records, imaging results, and lab data.During this appointment the patient was examined, diagnoses were discussed and explained in a face to face manner. In addition extensive time was spent reviewing aforementioned diagnostic studies. Counseling including abnormal image results, differential diagnoses, treatment options, risk and benefits, lifestyle changes, current condition, and current medications was performed. Patient's comments, questions, and concerns were addressed and patient verbalized understanding. Based on this patient's presentation at our office, which is an orthopedic spine surgeon's office, this patient inherently / intrinsically has a risk, however minute, of developing issues such as Cauda equina syndrome, bowel and bladder changes, or progression of motor or neurological deficits such as paralysis which may be permanent.  NATTY FERRERA Acting as a Scribe for Natty Mejias, attest that this documentation has been prepared under the direction and in the presence of Provider Jearld Bustamante MD.

## 2024-05-16 ENCOUNTER — APPOINTMENT (OUTPATIENT)
Dept: ORTHOPEDIC SURGERY | Facility: CLINIC | Age: 69
End: 2024-05-16

## 2024-05-23 ENCOUNTER — APPOINTMENT (OUTPATIENT)
Dept: ORTHOPEDIC SURGERY | Facility: CLINIC | Age: 69
End: 2024-05-23
Payer: MEDICARE

## 2024-05-23 VITALS — HEIGHT: 63 IN | WEIGHT: 140 LBS | BODY MASS INDEX: 24.8 KG/M2

## 2024-05-23 DIAGNOSIS — S83.242A OTHER TEAR OF MEDIAL MENISCUS, CURRENT INJURY, LEFT KNEE, INITIAL ENCOUNTER: ICD-10-CM

## 2024-05-23 PROCEDURE — 73564 X-RAY EXAM KNEE 4 OR MORE: CPT | Mod: LT

## 2024-05-23 PROCEDURE — 99214 OFFICE O/P EST MOD 30 MIN: CPT

## 2024-05-24 ENCOUNTER — APPOINTMENT (OUTPATIENT)
Dept: ORTHOPEDIC SURGERY | Facility: CLINIC | Age: 69
End: 2024-05-24

## 2024-05-24 PROBLEM — S83.242A ACUTE MEDIAL MENISCUS TEAR OF LEFT KNEE: Status: ACTIVE | Noted: 2024-05-23

## 2024-05-24 NOTE — HISTORY OF PRESENT ILLNESS
[de-identified] : The patient is a 68 year  old R hand dominant female who presents today complaining of L knee pain   Date of Injury/Onset: 10/2022 Pain:    At Rest: 0/10  With Activity:  8/10  Mechanism of injury: fell playing pickle ball no specific CHANELLE This is NOT a Work Related Injury being treated under Worker's Compensation. This is NOT an athletic injury occurring associated with an interscholastic or organized sports team. Quality of symptoms: heavy, weakness, dull, instability Improves with: rest Worse with: terminal extension, walking, changing directions Prior treatment: none sine surgery  Prior Imaging: none Out of work/sport: currently working part time School/Sport/Position/Occupation: teacher Additional Information: Hx: L knee Arthroscopy in 03/2023 at [facility], L knee arthroscopy with Dr. Haynes

## 2024-05-24 NOTE — IMAGING
[Left] : left knee [de-identified] : The patient is a well appearing 68 year old female of their stated age. Patient ambulates with a mild limp.  Positive straight leg raise bilateral   Effected Knee: LEFT ROM:  5-130 degrees PAIN WITH FLEXON Lachman: Negative Pivot Shift: Negative Anterior Drawer: Negative Posterior Drawer / Sag: Negative Varus Stress 0 degrees: Stable Varus Stress 30 degrees: Stable Valgus Stress 0 degrees: Stable Valgus Stress 30 degrees: Stable Medial Jimmie: POSITIVE  Lateral Jimmie: Negative Patella Glide: 2+ Patella Apprehension: Negative Patella Grind: Negative   Palpation: Medial Joint Line: TENDER  Lateral Joint Line: Nontender Medial Collateral Ligament: Nontender Lateral Collateral Ligament/PLC: Nontender Distal Femur: Nontender Proximal Tibia: Nontender Tibial Tubercle: Nontender Distal Pole Patella: Nontender Quadriceps Tendon: Nontender &  Intact Patella Tendon: Nontender &  Intact Medial Femoral Condyle: Nontender Medial Distal Hamstring/PES: TENDER  Lateral Distal Hamstring: Nontender & Stable Iliotibial Band: Nontender Medial Patellofemoral Ligament: Nontender Adductor: Nontender Proximal GSC-Plantaris: Nontender Calf: Supple & Nontender   Inspection: Deformity: No Erythema: No Ecchymosis: No Abrasions: No Effusion: No Prepatella Bursitis: No Neurologic Exam: Sensation L4-S1: Grossly Intact Motor Exam: Quadriceps: 3 out of 5 Hamstrings: 5 out of 5 EHL: 5 out of 5 FHL: 5 out of 5 TA: 5 out of 5 GS: 5 out of 5 Circulatory/Pulses: Dorsalis Pedis: 2+ Posterior Tibialis: 2+ Additional Pertinent Findings: None   Contralateral Knee:                          ROM: 0-145 degrees Other Pertinent Findings: None   Assessment: The patient is a 68 year old female with Left knee pain and radiographic and physical exam findings consistent with medial meniscal root tear and lumbar radiculopathy. The patients condition is acute Documents/Results Reviewed Today: X-ray left knee Tests/Studies Independently Interpreted Today: X-Ray of left knee reveals evidence of button on tibia consistent with medial meniscal root repair  Pertinent findings include: multiple arthroscopic portal sites, 3/5 Quad, 5-130 pain with Flexon, MJLT, Tender medial hamstring, + medial [facility] Confounding medical conditions/concerns: hx of Left knee arthroscopy in 3/2023 at [facility] and Left knee arthroscopy with Dr. Haynes.  Hx of neck surgery. stenosis of L5-S1, bilateral L5 nerve roots.    Plan: Recommended patient go back to Dr. Escudero and talk to him about her lumbar radiculopathy.  Discussed with patient that her lumbar radiculopathy could be causing her knee pain. Recommended after seeing Dr. Escudero and getting her back treated  if her knee pain persists then we can evaluate getting an MRI.  Tests Ordered: None  Prescription Medications Ordered: Discussed appropriate use of OTC anti-inflammatories and analgesics (including but not limited to Aleve, Advil, Tylenol, Motrin, Ibuprofen, Voltaren gel, etc.) Braces/DME Ordered: None Activity/Work/Sports Status: None Additional Instructions: None Follow-Up: PRN    The patient's current medication management of their orthopedic diagnosis was reviewed today: (1) We discussed a comprehensive treatment plan that included possible pharmaceutical management involving the use of prescription strength medications including but not limited to options such as oral Naprosyn 500mg BID, once daily Meloxicam 15 mg, or 500-650 mg Tylenol versus over the counter oral medications and topical prescription NSAID Pennsaid vs over the counter Voltaren gel.  Based on our extensive discussion, the patient declined prescription medication and will use over the counter Advil, Alleve, Voltaren Gel or Tylenol as directed. (2) There is a moderate risk of morbidity with further treatment, especially from use of prescription strength medications and possible side effects of these medications which include upset stomach with oral medications, skin reactions to topical medications and cardiac/renal issues with long term use. (3) I recommended that the patient follow-up with their medical physician to discuss any significant specific potential issues with long term medication use such as interactions with current medications or with exacerbation of underlying medical comorbidities. (4) The benefits and risks associated with use of injectable, oral or topical, prescription and over the counter anti-inflammatory medications were discussed with the patient. The patient voiced understanding of the risks including but not limited to bleeding, stroke, kidney dysfunction, heart disease, and were referred to the black box warning label for further information.  Maribell DELAROSA attest that this documentation has been prepared under the direction and in the presence of Provider Dr. Bradley Lara.  The documentation recorded by the scribe accurately reflects the services Dr. Bradley DELAROSA, personally performed and the decisions made by me. [FreeTextEntry9] : button on tibia consistent with medial meniscal root repair

## 2024-05-31 ENCOUNTER — APPOINTMENT (OUTPATIENT)
Dept: ORTHOPEDIC SURGERY | Facility: CLINIC | Age: 69
End: 2024-05-31
Payer: MEDICARE

## 2024-05-31 PROCEDURE — 99214 OFFICE O/P EST MOD 30 MIN: CPT

## 2024-06-02 NOTE — DISCUSSION/SUMMARY
[de-identified] : Cervical: MRI: There is a disc osteophyte complex resulting in moderate central canal stenosis flattening the ventral cord. Uncovertebral arthropathy results in severe left foraminal narrowing. mild disc herniations at every level.   Lumbar: MRI: L4/5 spondylolisthesis with mod lateral recess stenosis  Plan: I am referring the patient to pain management to discuss trying L4/5 MADI and DENA for pain relief.  Updated cervical mri requested as her last study is from April 2023, patient has worsening neck and arm pain despite HEP and NSAID Continuation of cervical and lumbar home exercises, emphasis on lower extremity strengthening exercises Surgical candidate for ACDF if symptoms become functionally incapacitating despite conservative care Follow up in 6 weeks Cumulative encounter duration exceeded 30 minutes.  We have discussed the risks, benefits, and alternatives NSAID therapy including but not limited to the risk of bleeding, thrombosis, gastric mucosal irritation/ulceration, allergic reaction and kidney dysfunction; the patient verbalizes an understanding.   Prior to appointment and during encounter with patient extensive medical records were reviewed including but not limited to, hospital records, outpatient records, imaging results, and lab data.During this appointment the patient was examined, diagnoses were discussed and explained in a face to face manner. In addition extensive time was spent reviewing aforementioned diagnostic studies. Counseling including abnormal image results, differential diagnoses, treatment options, risk and benefits, lifestyle changes, current condition, and current medications was performed. Patient's comments, questions, and concerns were addressed and patient verbalized understanding. Based on this patient's presentation at our office, which is an orthopedic spine surgeon's office, this patient inherently / intrinsically has a risk, however minute, of developing issues such as Cauda equina syndrome, bowel and bladder changes, or progression of motor or neurological deficits such as paralysis which may be permanent.  NATTY FERRERA Acting as a Scribe for Natty Mejias, attest that this documentation has been prepared under the direction and in the presence of Provider Jerald Bustamante MD.

## 2024-06-02 NOTE — PHYSICAL EXAM
[Normal Coordination] : normal coordination [Normal DTR UE/LE] : normal DTR UE/LE  [Normal Sensation] : normal sensation [Normal Mood and Affect] : normal mood and affect [Oriented] : oriented [Able to Communicate] : able to communicate [Normal Skin] : normal skin [No Rash] : no rash [No Ulcers] : no ulcers [No Lesions] : no lesions [No obvious lymphadenopathy in areas examined] : no obvious lymphadenopathy in areas examined [Well Developed] : well developed [Peripheral vascular exam is grossly normal] : peripheral vascular exam is grossly normal [No Respiratory Distress] : no respiratory distress [Flexion] : flexion [Extension] : extension [de-identified] : Constitutional:\par  - General Appearance:\par  Unremarkable\par  Body Habitus\par  Well Developed\par  Well Nourished\par  Body Habitus\par  No Deformities\par  Well Groomed\par  Ability To communicate:\par  Normal\par  Neurologic:\par  Global sensation is intact to upper and lower extremities. See examination of Neck and/or Spine\par  for exceptions.\par  Orientation to Time, Place and Person is: Normal\par  Mood And Affect is Normal\par  Skin:\par  - Head/Face, Right Upper/Lower Extremity, Left Upper/Lower Extremity: Normal\par  See Examination of Neck and/or Spine for exceptions\par  Cardiovascular:\par  Peripheral Cardiovascular System is Normal\par  Palpation of Lymph Nodes:\par  Normal Palpation of lymph nodes in: Axilla, Cervical, Inguinal\par  Abnormal Palpation of lymph nodes in: None  [] : negative contralateral straight leg raise

## 2024-06-02 NOTE — DATA REVIEWED
[Cervical Spine] : cervical spine [MRI] : MRI [Lumbar Spine] : lumbar spine [Report was reviewed and noted in the chart] : The report was reviewed and noted in the chart [I independently reviewed and interpreted images and report] : I independently reviewed and interpreted images and report [I reviewed the films/CD and additionally noted] : I reviewed the films/CD and additionally noted [FreeTextEntry2] : [facility] Lumbar spine MRI May 2024 - L4/5 spondylolisthesis with mod lateral recess stenosis   [FreeTextEntry1] : I stop paperwork reviewed Orthopedic progress notes reviewed

## 2024-06-02 NOTE — HISTORY OF PRESENT ILLNESS
[de-identified] : 05/31/2024 - Patient presents to review lumbar spine MRI. Cervical Follow up. Complains of pain radiating throughout her left leg, b/l leg numbness (worse left side, numbing present immediately post op of knee), left quadriceps weakness, left knee pain, difficulty walking. Pain and tingling in the neck, nerve pain into the b/l arms.   05/08/2024 - Patient presenting in follow up. She feels the radicular pain in her right leg has gotten worse. Completed PT for knee and lumbar, sessions no longer authorized, and she transitioned to home exercises states HEP as somewhat helpful but her pain continues to be limited. Finds it very difficult to walk due to her unsuccessful left knee surgery july 17th 2023 with Dr. Lui, and feels her back is aggravated as a result of her gait being off. She does utilize Motrin when back and leg pain is severe, but not routinely due to kidney disease.   02/07/2024 - Return to the office for follow up. she was last seen in May 2023. She continues to have intermittent cervical pain primarily on the posterior aspect with radiation into the shoulders and upper trapezium muscles bilaterally. She does not report numbness, tingling or weakness into the arms. She did not move ahead with the cervical epidural injection as previously discussed. Lower back pain is stable. She has undergone so left knee [facility] and this is taking some time to improve but she does report progress with the left knee.  5/19/23: The patient is a 67 year female  who presents today follow up cervical spine and lumbar spine.  Patient reports improved cervical radicular pain in to her left arm and mild left upper trap pain but overall improved with PT at this point.  Her prdominant complaint is lower back worse with walking.  No radicular pain, rather isolated left knee pain in the setting of recent left knee meniscal repair in March 2023.  Pain:    At Rest: 0/10  With Activity:  0/10  Treatment: physical therapy Change since last visit: working part time  4/21/23: Patient presenting for an MRI review of cervical spine. Patient reports severity of her pain is worse since last visit. Severity of her pain ranges day to day. Chief complaint is radicular UE pain, but states severity of pain not as significant compared to pre op in 2015. No neurologic change since last visit.  Lumbar - Also complains of persistent low back pain. Intermittent pain extending throughout the LLE, most prominent at nighttime.   Pain at Rest: 4/10 Pain w/ activity: 9/10  3/31/23: The patient is a  67 year old, R dominant hand female who presents today for an eval of C spine, s/p ACDF C5-7 2015. Overall she has been doing well and relatively asymptomatic until 1 year ago. She reports onset of left UE radic starting 1 yr ago, no trauma or injury. She is not using nsaids due to GI distress. No right sided upper extremity symptoms, or change in UE strength. She reports progressive instability ( 2 falls). Dexterity is normal. Severity of pain not as severe compared to pre op, but states it has been progressive.  Lumbar - also complaints of constant LLE radic present daily. She presents with MRI study from [facility]. Severity ranges. Pain is worse at night.     [FreeTextEntry5] : complaining of left neck pain\par  Date of Injury/Onset: ~ 1 year ago\par  Pain:    At Rest: 0/10  \par  With Activity:  5/10  \par  Mechanism of injury: Had C spine surgery back in 2015, felt well all this long \par  Quality of symptoms: Sharp with certain movements,  uncomfortable, numbness, radiates down into the arms and hands\par  This is NOT a Worker's Compensation Injury\par  Improves with: None\par  Worse with: Moving the neck forwarded down, laying down\par  Prior treatment/ Imaging: MRI @ [facility]\par  Out of work: ;Since: \par  School/Sport/Position/Occupation: Retired\par  Additional Information: 2 weeks ago had left knee Arthroscopic surgery [facility] in [facility].

## 2024-06-03 ENCOUNTER — RESULT REVIEW (OUTPATIENT)
Age: 69
End: 2024-06-03

## 2024-06-17 ENCOUNTER — APPOINTMENT (OUTPATIENT)
Dept: ORTHOPEDIC SURGERY | Facility: CLINIC | Age: 69
End: 2024-06-17
Payer: MEDICARE

## 2024-06-17 VITALS — BODY MASS INDEX: 24.8 KG/M2 | HEIGHT: 63 IN | WEIGHT: 140 LBS

## 2024-06-17 DIAGNOSIS — M54.16 RADICULOPATHY, LUMBAR REGION: ICD-10-CM

## 2024-06-17 DIAGNOSIS — M43.16 SPONDYLOLISTHESIS, LUMBAR REGION: ICD-10-CM

## 2024-06-17 DIAGNOSIS — M54.12 RADICULOPATHY, CERVICAL REGION: ICD-10-CM

## 2024-06-17 PROCEDURE — 99214 OFFICE O/P EST MOD 30 MIN: CPT

## 2024-06-22 PROBLEM — M54.12 CERVICAL RADICULOPATHY, CHRONIC: Status: ACTIVE | Noted: 2023-03-31

## 2024-06-22 PROBLEM — M54.16 LUMBAR RADICULOPATHY, ACUTE: Status: ACTIVE | Noted: 2024-05-23

## 2024-06-22 PROBLEM — M43.16 SPONDYLOLISTHESIS OF LUMBAR REGION: Status: ACTIVE | Noted: 2023-03-31

## 2024-06-22 NOTE — DISCUSSION/SUMMARY
[de-identified] : Cervical: MRI [facility] 6/3/24: Post op changes from c5-c7 with broad based disc herniation in c4-5, moderate right and severe left foraminal stenosis. small central disc herniation in C3-4 Lumbar: MRI: L4/5 spondylolisthesis with mod lateral recess stenosis  Plan: I am referring the patient to pain management to discuss trying L4/5 MADI and DENA for pain relief.  Cervical spine MRI reviewed 6/3/24 at [facility],   Continuation of cervical and lumbar home exercises, emphasis on lower extremity strengthening exercises. Surgical candidate for ACDF if symptoms become functionally incapacitating despite conservative care Follow up in 6 weeks Cumulative encounter duration exceeded 30 minutes.  We have discussed the risks, benefits, and alternatives NSAID therapy including but not limited to the risk of bleeding, thrombosis, gastric mucosal irritation/ulceration, allergic reaction and kidney dysfunction; the patient verbalizes an understanding.   Prior to appointment and during encounter with patient extensive medical records were reviewed including but not limited to, hospital records, outpatient records, imaging results, and lab data.During this appointment the patient was examined, diagnoses were discussed and explained in a face to face manner. In addition extensive time was spent reviewing aforementioned diagnostic studies. Counseling including abnormal image results, differential diagnoses, treatment options, risk and benefits, lifestyle changes, current condition, and current medications was performed. Patient's comments, questions, and concerns were addressed and patient verbalized understanding. Based on this patient's presentation at our office, which is an orthopedic spine surgeon's office, this patient inherently / intrinsically has a risk, however minute, of developing issues such as Cauda equina syndrome, bowel and bladder changes, or progression of motor or neurological deficits such as paralysis which may be permanent.  DEB SWARTZ acting as a Scribe for Deb Mejias, attest that this documentation has been prepared under the direction and in the presence of Provider Jerald Bustamante MD.

## 2024-06-22 NOTE — DATA REVIEWED
[Lumbar Spine] : lumbar spine [Report was reviewed and noted in the chart] : The report was reviewed and noted in the chart [I reviewed the films/CD and additionally noted] : I reviewed the films/CD and additionally noted [MRI] : MRI [Cervical Spine] : cervical spine [I independently reviewed and interpreted images and report] : I independently reviewed and interpreted images and report [I reviewed the films/CD] : I reviewed the films/CD [FreeTextEntry2] : [facility] Lumbar spine MRI May 2024 - L4/5 spondylolisthesis with mod lateral recess stenosis   [FreeTextEntry3] : Cervical MRI @ [facility] 6/3/24: Post op changes from c5-c7 with broad based disc herniation in c4-5, moderate right and severe left foraminal stenosis. small central disc herniation in C3-4 [FreeTextEntry1] : I stop paperwork reviewed Orthopedic progress notes reviewed

## 2024-06-22 NOTE — PHYSICAL EXAM
[Normal Coordination] : normal coordination [Normal DTR UE/LE] : normal DTR UE/LE  [Normal Sensation] : normal sensation [Normal Mood and Affect] : normal mood and affect [Oriented] : oriented [Able to Communicate] : able to communicate [Normal Skin] : normal skin [No Rash] : no rash [No Ulcers] : no ulcers [No Lesions] : no lesions [No obvious lymphadenopathy in areas examined] : no obvious lymphadenopathy in areas examined [Well Developed] : well developed [Peripheral vascular exam is grossly normal] : peripheral vascular exam is grossly normal [No Respiratory Distress] : no respiratory distress [Flexion] : flexion [Extension] : extension [de-identified] : Constitutional:\par  - General Appearance:\par  Unremarkable\par  Body Habitus\par  Well Developed\par  Well Nourished\par  Body Habitus\par  No Deformities\par  Well Groomed\par  Ability To communicate:\par  Normal\par  Neurologic:\par  Global sensation is intact to upper and lower extremities. See examination of Neck and/or Spine\par  for exceptions.\par  Orientation to Time, Place and Person is: Normal\par  Mood And Affect is Normal\par  Skin:\par  - Head/Face, Right Upper/Lower Extremity, Left Upper/Lower Extremity: Normal\par  See Examination of Neck and/or Spine for exceptions\par  Cardiovascular:\par  Peripheral Cardiovascular System is Normal\par  Palpation of Lymph Nodes:\par  Normal Palpation of lymph nodes in: Axilla, Cervical, Inguinal\par  Abnormal Palpation of lymph nodes in: None  [] : negative contralateral straight leg raise

## 2024-06-22 NOTE — HISTORY OF PRESENT ILLNESS
[Neck] : neck [8] : 8 [7] : 7 [Dull/Aching] : dull/aching [Sharp] : sharp [Shooting] : shooting [Constant] : constant [Sleep] : sleep [Meds] : meds [Walking] : walking [Retired] : Work status: retired [de-identified] : 06/17/2024: Patient is here today for a follow up for cervical spine MRI results. She reports that her entire left arm feels numb, has not yet been able to undergo epidural injections. Neck worse than lower back. She also reports severe soreness in the left shoulder.   05/31/2024 - Patient presents to review lumbar spine MRI. Cervical Follow up. Complains of pain radiating throughout her left leg, b/l leg numbness (worse left side, numbing present immediately post op of knee), left quadriceps weakness, left knee pain, difficulty walking. Pain and tingling in the neck, nerve pain into the b/l arms.   05/08/2024 - Patient presenting in follow up. She feels the radicular pain in her right leg has gotten worse. Completed PT for knee and lumbar, sessions no longer authorized, and she transitioned to home exercises states HEP as somewhat helpful but her pain continues to be limited. Finds it very difficult to walk due to her unsuccessful left knee surgery july 17th 2023 with Dr. Lui, and feels her back is aggravated as a result of her gait being off. She does utilize Motrin when back and leg pain is severe, but not routinely due to kidney disease.   02/07/2024 - Return to the office for follow up. she was last seen in May 2023. She continues to have intermittent cervical pain primarily on the posterior aspect with radiation into the shoulders and upper trapezium muscles bilaterally. She does not report numbness, tingling or weakness into the arms. She did not move ahead with the cervical epidural injection as previously discussed. Lower back pain is stable. She has undergone so left knee surgery with Dr. Lui at [place] and this is taking some time to improve but she does report progress with the left knee.  5/19/23: The patient is a 67 year female  who presents today follow up cervical spine and lumbar spine.  Patient reports improved cervical radicular pain in to her left arm and mild left upper trap pain but overall improved with PT at this point.  Her prdominant complaint is lower back worse with walking.  No radicular pain, rather isolated left knee pain in the setting of recent left knee meniscal repair in March 2023.  Pain:    At Rest: 0/10  With Activity:  0/10  Treatment: physical therapy Change since last visit: working part time  4/21/23: Patient presenting for an MRI review of cervical spine. Patient reports severity of her pain is worse since last visit. Severity of her pain ranges day to day. Chief complaint is radicular UE pain, but states severity of pain not as significant compared to pre op in 2015. No neurologic change since last visit.  Lumbar - Also complains of persistent low back pain. Intermittent pain extending throughout the LLE, most prominent at nighttime.   Pain at Rest: 4/10 Pain w/ activity: 9/10  3/31/23: The patient is a  67 year old, R dominant hand female who presents today for an eval of C spine, s/p ACDF C5-7 2015. Overall she has been doing well and relatively asymptomatic until 1 year ago. She reports onset of left UE radic starting 1 yr ago, no trauma or injury. She is not using nsaids due to GI distress. No right sided upper extremity symptoms, or change in UE strength. She reports progressive instability ( 2 falls). Dexterity is normal. Severity of pain not as severe compared to pre op, but states it has been progressive.  Lumbar - also complaints of constant LLE radic present daily. She presents with MRI study from [facility]. Severity ranges. Pain is worse at night.     [] : no [FreeTextEntry7] : Rachid hands [de-identified] : movement [de-identified] : MRI

## 2024-07-16 ENCOUNTER — OFFICE (OUTPATIENT)
Dept: URBAN - METROPOLITAN AREA CLINIC 12 | Facility: CLINIC | Age: 69
Setting detail: OPHTHALMOLOGY
End: 2024-07-16
Payer: MEDICARE

## 2024-07-16 DIAGNOSIS — H04.123: ICD-10-CM

## 2024-07-16 PROBLEM — H16.223 DRY EYE SYNDROME K SICCA; BOTH EYES: Status: ACTIVE | Noted: 2024-07-16

## 2024-07-16 PROCEDURE — 92012 INTRM OPH EXAM EST PATIENT: CPT | Performed by: OPHTHALMOLOGY

## 2024-07-16 ASSESSMENT — CONFRONTATIONAL VISUAL FIELD TEST (CVF)
OD_FINDINGS: FULL
OS_FINDINGS: FULL

## 2024-07-22 ENCOUNTER — APPOINTMENT (OUTPATIENT)
Dept: ORTHOPEDIC SURGERY | Facility: CLINIC | Age: 69
End: 2024-07-22
Payer: MEDICARE

## 2024-07-22 VITALS — BODY MASS INDEX: 24.8 KG/M2 | HEIGHT: 63 IN | WEIGHT: 140 LBS

## 2024-07-22 DIAGNOSIS — M54.16 RADICULOPATHY, LUMBAR REGION: ICD-10-CM

## 2024-07-22 DIAGNOSIS — M43.16 SPONDYLOLISTHESIS, LUMBAR REGION: ICD-10-CM

## 2024-07-22 DIAGNOSIS — M54.12 RADICULOPATHY, CERVICAL REGION: ICD-10-CM

## 2024-07-22 PROCEDURE — 99214 OFFICE O/P EST MOD 30 MIN: CPT

## 2024-07-23 NOTE — DISCUSSION/SUMMARY
[de-identified] : Cervical: MRI [facility] 6/3/24: Post op changes from c5-c7 with broad based disc herniation in c4-5, moderate right and severe left foraminal stenosis. small central disc herniation in C3-4 Lumbar: MRI: L4/5 spondylolisthesis with mod lateral recess stenosis  Plan: Patient is scheduled to see outside pain management, Dr. Simón Wren later this week. She will discuss possible lumbar spinal steroid injection. She is going to hold off on doing any additional cervical spinal steroid injections at this time. Request she follow up with us in 6 to 8 weeks for reevaluation and to see how she responds to lumbar intervention. We again reviewed Surgical options (ACDF, Lumbar lami/PSF)for her should she find that she is refractory to treatment and significantly symptomatic    Cumulative encounter duration exceeded 30 minutes.  We have discussed the risks, benefits, and alternatives NSAID therapy including but not limited to the risk of bleeding, thrombosis, gastric mucosal irritation/ulceration, allergic reaction and kidney dysfunction; the patient verbalizes an understanding.   Prior to appointment and during encounter with patient extensive medical records were reviewed including but not limited to, hospital records, outpatient records, imaging results, and lab data.During this appointment the patient was examined, diagnoses were discussed and explained in a face to face manner. In addition extensive time was spent reviewing aforementioned diagnostic studies. Counseling including abnormal image results, differential diagnoses, treatment options, risk and benefits, lifestyle changes, current condition, and current medications was performed. Patient's comments, questions, and concerns were addressed and patient verbalized understanding. Based on this patient's presentation at our office, which is an orthopedic spine surgeon's office, this patient inherently / intrinsically has a risk, however minute, of developing issues such as Cauda equina syndrome, bowel and bladder changes, or progression of motor or neurological deficits such as paralysis which may be permanent.

## 2024-07-23 NOTE — HISTORY OF PRESENT ILLNESS
[de-identified] : 07/22/2024 - Patient returns to the office in follow up. She reports improvement with the upper extremity symptoms following recent cervical epidural injection. Continue to experience some low back and lower extremity symptoms. Overall, she feels that cervical symptoms are more tolerable at this point. She is contemplating interventional procedure for the lower back.  06/17/2024: Patient is here today for a follow up for cervical spine MRI results. She reports that her entire left arm feels numb, has not yet been able to undergo epidural injections. Neck worse than lower back. She also reports severe soreness in the left shoulder.   05/31/2024 - Patient presents to review lumbar spine MRI. Cervical Follow up. Complains of pain radiating throughout her left leg, b/l leg numbness (worse left side, numbing present immediately post op of knee), left quadriceps weakness, left knee pain, difficulty walking. Pain and tingling in the neck, nerve pain into the b/l arms.   05/08/2024 - Patient presenting in follow up. She feels the radicular pain in her right leg has gotten worse. Completed PT for knee and lumbar, sessions no longer authorized, and she transitioned to home exercises states HEP as somewhat helpful but her pain continues to be limited. Finds it very difficult to walk due to her unsuccessful left knee surgery july 17th 2023 with Dr. Lui, and feels her back is aggravated as a result of her gait being off. She does utilize Motrin when back and leg pain is severe, but not routinely due to kidney disease.   02/07/2024 - Return to the office for follow up. she was last seen in May 2023. She continues to have intermittent cervical pain primarily on the posterior aspect with radiation into the shoulders and upper trapezium muscles bilaterally. She does not report numbness, tingling or weakness into the arms. She did not move ahead with the cervical epidural injection as previously discussed. Lower back pain is stable. She has undergone so left knee surgery with Dr. Lui at [place] and this is taking some time to improve but she does report progress with the left knee.  5/19/23: The patient is a 67 year female  who presents today follow up cervical spine and lumbar spine.  Patient reports improved cervical radicular pain in to her left arm and mild left upper trap pain but overall improved with PT at this point.  Her prdominant complaint is lower back worse with walking.  No radicular pain, rather isolated left knee pain in the setting of recent left knee meniscal repair in March 2023.  Pain:    At Rest: 0/10  With Activity:  0/10  Treatment: physical therapy Change since last visit: working part time  4/21/23: Patient presenting for an MRI review of cervical spine. Patient reports severity of her pain is worse since last visit. Severity of her pain ranges day to day. Chief complaint is radicular UE pain, but states severity of pain not as significant compared to pre op in 2015. No neurologic change since last visit.  Lumbar - Also complains of persistent low back pain. Intermittent pain extending throughout the LLE, most prominent at nighttime.   Pain at Rest: 4/10 Pain w/ activity: 9/10  3/31/23: The patient is a  67 year old, R dominant hand female who presents today for an eval of C spine, s/p ACDF C5-7 2015. Overall she has been doing well and relatively asymptomatic until 1 year ago. She reports onset of left UE radic starting 1 yr ago, no trauma or injury. She is not using nsaids due to GI distress. No right sided upper extremity symptoms, or change in UE strength. She reports progressive instability ( 2 falls). Dexterity is normal. Severity of pain not as severe compared to pre op, but states it has been progressive.  Lumbar - also complaints of constant LLE radic present daily. She presents with MRI study from [facility]. Severity ranges. Pain is worse at night.     [FreeTextEntry6] : numbness [FreeTextEntry7] : b/l arms, b/l legs (rt > lt) [de-identified] : pain mgmt- cleve

## 2024-07-23 NOTE — PHYSICAL EXAM
[de-identified] : Constitutional:\par  - General Appearance:\par  Unremarkable\par  Body Habitus\par  Well Developed\par  Well Nourished\par  Body Habitus\par  No Deformities\par  Well Groomed\par  Ability To communicate:\par  Normal\par  Neurologic:\par  Global sensation is intact to upper and lower extremities. See examination of Neck and/or Spine\par  for exceptions.\par  Orientation to Time, Place and Person is: Normal\par  Mood And Affect is Normal\par  Skin:\par  - Head/Face, Right Upper/Lower Extremity, Left Upper/Lower Extremity: Normal\par  See Examination of Neck and/or Spine for exceptions\par  Cardiovascular:\par  Peripheral Cardiovascular System is Normal\par  Palpation of Lymph Nodes:\par  Normal Palpation of lymph nodes in: Axilla, Cervical, Inguinal\par  Abnormal Palpation of lymph nodes in: None  [] : negative equivocal straight leg raise

## 2024-07-23 NOTE — DATA REVIEWED
[FreeTextEntry2] : [facility] Lumbar spine MRI May 2024 - L4/5 spondylolisthesis with mod lateral recess stenosis   [FreeTextEntry3] : Cervical MRI @ [facility] 6/3/24: Post op changes from c5-c7 with broad based disc herniation in c4-5, moderate right and severe left foraminal stenosis. small central disc herniation in C3-4 [FreeTextEntry1] : I stop paperwork reviewed Orthopedic progress notes reviewed

## 2024-08-28 ENCOUNTER — APPOINTMENT (OUTPATIENT)
Dept: ORTHOPEDIC SURGERY | Facility: CLINIC | Age: 69
End: 2024-08-28
Payer: MEDICARE

## 2024-08-28 VITALS — WEIGHT: 140 LBS | HEIGHT: 63 IN | BODY MASS INDEX: 24.8 KG/M2

## 2024-08-28 DIAGNOSIS — M54.16 RADICULOPATHY, LUMBAR REGION: ICD-10-CM

## 2024-08-28 DIAGNOSIS — M54.12 RADICULOPATHY, CERVICAL REGION: ICD-10-CM

## 2024-08-28 DIAGNOSIS — M43.16 SPONDYLOLISTHESIS, LUMBAR REGION: ICD-10-CM

## 2024-08-28 PROCEDURE — 99214 OFFICE O/P EST MOD 30 MIN: CPT

## 2024-08-28 RX ORDER — CYCLOBENZAPRINE HYDROCHLORIDE 5 MG/1
5 TABLET, FILM COATED ORAL
Qty: 30 | Refills: 1 | Status: ACTIVE | COMMUNITY
Start: 2024-08-28 | End: 1900-01-01

## 2024-08-29 NOTE — HISTORY OF PRESENT ILLNESS
[0] : 0 [Radiating] : radiating [] : yes [Constant] : constant [Part time] : Work status: part time [de-identified] : 08/28/2024 - Patient presenting in follow up for cervical and lumbar spine. She complains of newer pain in the RT buttock, and she is indicating pain in her LT wrist. States arm symptoms are new compared to prior symptoms. Also brings a recent EMG. Taking cyclobenzaprine 5 mg which she feels is helpful for neck pain, but other described symptoms remain unchanged. She had DENA June 16th.   07/22/2024 - Patient returns to the office in follow up. She reports improvement with the upper extremity symptoms following recent cervical epidural injection. Continue to experience some low back and lower extremity symptoms. Overall, she feels that cervical symptoms are more tolerable at this point. She is contemplating interventional procedure for the lower back.  06/17/2024: Patient is here today for a follow up for cervical spine MRI results. She reports that her entire left arm feels numb, has not yet been able to undergo epidural injections. Neck worse than lower back. She also reports severe soreness in the left shoulder.   05/31/2024 - Patient presents to review lumbar spine MRI. Cervical Follow up. Complains of pain radiating throughout her left leg, b/l leg numbness (worse left side, numbing present immediately post op of knee), left quadriceps weakness, left knee pain, difficulty walking. Pain and tingling in the neck, nerve pain into the b/l arms.   05/08/2024 - Patient presenting in follow up. She feels the radicular pain in her right leg has gotten worse. Completed PT for knee and lumbar, sessions no longer authorized, and she transitioned to home exercises states HEP as somewhat helpful but her pain continues to be limited. Finds it very difficult to walk due to her unsuccessful left knee surgery july 17th 2023 with Dr. Lui, and feels her back is aggravated as a result of her gait being off. She does utilize Motrin when back and leg pain is severe, but not routinely due to kidney disease.   02/07/2024 - Return to the office for follow up. she was last seen in May 2023. She continues to have intermittent cervical pain primarily on the posterior aspect with radiation into the shoulders and upper trapezium muscles bilaterally. She does not report numbness, tingling or weakness into the arms. She did not move ahead with the cervical epidural injection as previously discussed. Lower back pain is stable. She has undergone so left knee surgery with Dr. Lui at [place] and this is taking some time to improve but she does report progress with the left knee.  5/19/23: The patient is a 67 year female  who presents today follow up cervical spine and lumbar spine.  Patient reports improved cervical radicular pain in to her left arm and mild left upper trap pain but overall improved with PT at this point.  Her prdominant complaint is lower back worse with walking.  No radicular pain, rather isolated left knee pain in the setting of recent left knee meniscal repair in March 2023.  Pain:    At Rest: 0/10  With Activity:  0/10  Treatment: physical therapy Change since last visit: working part time  4/21/23: Patient presenting for an MRI review of cervical spine. Patient reports severity of her pain is worse since last visit. Severity of her pain ranges day to day. Chief complaint is radicular UE pain, but states severity of pain not as significant compared to pre op in 2015. No neurologic change since last visit.  Lumbar - Also complains of persistent low back pain. Intermittent pain extending throughout the LLE, most prominent at nighttime.   Pain at Rest: 4/10 Pain w/ activity: 9/10  3/31/23: The patient is a  67 year old, R dominant hand female who presents today for an eval of C spine, s/p ACDF C5-7 2015. Overall she has been doing well and relatively asymptomatic until 1 year ago. She reports onset of left UE radic starting 1 yr ago, no trauma or injury. She is not using nsaids due to GI distress. No right sided upper extremity symptoms, or change in UE strength. She reports progressive instability ( 2 falls). Dexterity is normal. Severity of pain not as severe compared to pre op, but states it has been progressive.  Lumbar - also complaints of constant LLE radic present daily. She presents with MRI study from [facility]. Severity ranges. Pain is worse at night.     [FreeTextEntry6] : numbness [FreeTextEntry7] : b/l arms, b/l legs (rt > lt) [de-identified] : pain mgmt- cleve

## 2024-08-29 NOTE — DATA REVIEWED
[Lumbar Spine] : lumbar spine [Report was reviewed and noted in the chart] : The report was reviewed and noted in the chart [I reviewed the films/CD and additionally noted] : I reviewed the films/CD and additionally noted [MRI] : MRI [Cervical Spine] : cervical spine [I independently reviewed and interpreted images and report] : I independently reviewed and interpreted images and report [I reviewed the films/CD] : I reviewed the films/CD [EMG Nerve Conduction] : A EMG Nerve Conduction test was completed of the [Lower extremity] : lower extremity [FreeTextEntry2] : [facility] Lumbar spine MRI May 2024 - L4/5 spondylolisthesis with mod lateral recess stenosis   [FreeTextEntry3] : Cervical MRI @ [facility] 6/3/24: Post op changes from c5-c7 with broad based disc herniation in c4-5, moderate right and severe left foraminal stenosis. small central disc herniation in C3-4 [FreeTextEntry1] : I stop paperwork reviewed Orthopedic progress notes reviewed Pain mgmt progress notes reviewed

## 2024-08-29 NOTE — HISTORY OF PRESENT ILLNESS
[0] : 0 [Radiating] : radiating [] : yes [Constant] : constant [Part time] : Work status: part time [de-identified] : 08/28/2024 - Patient presenting in follow up for cervical and lumbar spine. She complains of newer pain in the RT buttock, and she is indicating pain in her LT wrist. States arm symptoms are new compared to prior symptoms. Also brings a recent EMG. Taking cyclobenzaprine 5 mg which she feels is helpful for neck pain, but other described symptoms remain unchanged. She had DENA June 16th.   07/22/2024 - Patient returns to the office in follow up. She reports improvement with the upper extremity symptoms following recent cervical epidural injection. Continue to experience some low back and lower extremity symptoms. Overall, she feels that cervical symptoms are more tolerable at this point. She is contemplating interventional procedure for the lower back.  06/17/2024: Patient is here today for a follow up for cervical spine MRI results. She reports that her entire left arm feels numb, has not yet been able to undergo epidural injections. Neck worse than lower back. She also reports severe soreness in the left shoulder.   05/31/2024 - Patient presents to review lumbar spine MRI. Cervical Follow up. Complains of pain radiating throughout her left leg, b/l leg numbness (worse left side, numbing present immediately post op of knee), left quadriceps weakness, left knee pain, difficulty walking. Pain and tingling in the neck, nerve pain into the b/l arms.   05/08/2024 - Patient presenting in follow up. She feels the radicular pain in her right leg has gotten worse. Completed PT for knee and lumbar, sessions no longer authorized, and she transitioned to home exercises states HEP as somewhat helpful but her pain continues to be limited. Finds it very difficult to walk due to her unsuccessful left knee surgery july 17th 2023 with Dr. Lui, and feels her back is aggravated as a result of her gait being off. She does utilize Motrin when back and leg pain is severe, but not routinely due to kidney disease.   02/07/2024 - Return to the office for follow up. she was last seen in May 2023. She continues to have intermittent cervical pain primarily on the posterior aspect with radiation into the shoulders and upper trapezium muscles bilaterally. She does not report numbness, tingling or weakness into the arms. She did not move ahead with the cervical epidural injection as previously discussed. Lower back pain is stable. She has undergone so left knee surgery with Dr. Lui at [place] and this is taking some time to improve but she does report progress with the left knee.  5/19/23: The patient is a 67 year female  who presents today follow up cervical spine and lumbar spine.  Patient reports improved cervical radicular pain in to her left arm and mild left upper trap pain but overall improved with PT at this point.  Her prdominant complaint is lower back worse with walking.  No radicular pain, rather isolated left knee pain in the setting of recent left knee meniscal repair in March 2023.  Pain:    At Rest: 0/10  With Activity:  0/10  Treatment: physical therapy Change since last visit: working part time  4/21/23: Patient presenting for an MRI review of cervical spine. Patient reports severity of her pain is worse since last visit. Severity of her pain ranges day to day. Chief complaint is radicular UE pain, but states severity of pain not as significant compared to pre op in 2015. No neurologic change since last visit.  Lumbar - Also complains of persistent low back pain. Intermittent pain extending throughout the LLE, most prominent at nighttime.   Pain at Rest: 4/10 Pain w/ activity: 9/10  3/31/23: The patient is a  67 year old, R dominant hand female who presents today for an eval of C spine, s/p ACDF C5-7 2015. Overall she has been doing well and relatively asymptomatic until 1 year ago. She reports onset of left UE radic starting 1 yr ago, no trauma or injury. She is not using nsaids due to GI distress. No right sided upper extremity symptoms, or change in UE strength. She reports progressive instability ( 2 falls). Dexterity is normal. Severity of pain not as severe compared to pre op, but states it has been progressive.  Lumbar - also complaints of constant LLE radic present daily. She presents with MRI study from [facility]. Severity ranges. Pain is worse at night.     [FreeTextEntry6] : numbness [FreeTextEntry7] : b/l arms, b/l legs (rt > lt) [de-identified] : pain mgmt- cleve

## 2024-08-29 NOTE — DISCUSSION/SUMMARY
[de-identified] : Cervical: MRI [facility] 6/3/24: Post op changes from c5-c7 with broad based disc herniation in c4-5, moderate right and severe left foraminal stenosis. small central disc herniation in C3-4 Lumbar: MRI: L4/5 spondylolisthesis with mod lateral recess stenosis  Plan: Discussed second DENA with Dr. Wren vs ACDF. We again reviewed Surgical options (ACDF, Lumbar lami/PSF)for her should she find that she is refractory to treatment and significantly symptomatic. No surgical plans made today. She will continue with cyclobenzaprine 5 mg. Poor candidate for NSAID usage secondary to familial hx kidney dx.   Follow up in 6 weeks   Prior to appointment and during encounter with patient extensive medical records were reviewed including but not limited to, hospital records, outpatient records, imaging results, and lab data.During this appointment the patient was examined, diagnoses were discussed and explained in a face to face manner. In addition extensive time was spent reviewing aforementioned diagnostic studies. Counseling including abnormal image results, differential diagnoses, treatment options, risk and benefits, lifestyle changes, current condition, and current medications was performed. Patient's comments, questions, and concerns were addressed and patient verbalized understanding. Based on this patient's presentation at our office, which is an orthopedic spine surgeon's office, this patient inherently / intrinsically has a risk, however minute, of developing issues such as Cauda equina syndrome, bowel and bladder changes, or progression of motor or neurological deficits such as paralysis which may be permanent.  NATTY FERRERA Acting as a Scribe for Natty Mejias, attest that this documentation has been prepared under the direction and in the presence of Provider Jerald Bustamante MD.

## 2024-08-29 NOTE — DISCUSSION/SUMMARY
[de-identified] : Cervical: MRI [facility] 6/3/24: Post op changes from c5-c7 with broad based disc herniation in c4-5, moderate right and severe left foraminal stenosis. small central disc herniation in C3-4 Lumbar: MRI: L4/5 spondylolisthesis with mod lateral recess stenosis  Plan: Discussed second DENA with Dr. Wren vs ACDF. We again reviewed Surgical options (ACDF, Lumbar lami/PSF)for her should she find that she is refractory to treatment and significantly symptomatic. No surgical plans made today. She will continue with cyclobenzaprine 5 mg. Poor candidate for NSAID usage secondary to familial hx kidney dx.   Follow up in 6 weeks   Prior to appointment and during encounter with patient extensive medical records were reviewed including but not limited to, hospital records, outpatient records, imaging results, and lab data.During this appointment the patient was examined, diagnoses were discussed and explained in a face to face manner. In addition extensive time was spent reviewing aforementioned diagnostic studies. Counseling including abnormal image results, differential diagnoses, treatment options, risk and benefits, lifestyle changes, current condition, and current medications was performed. Patient's comments, questions, and concerns were addressed and patient verbalized understanding. Based on this patient's presentation at our office, which is an orthopedic spine surgeon's office, this patient inherently / intrinsically has a risk, however minute, of developing issues such as Cauda equina syndrome, bowel and bladder changes, or progression of motor or neurological deficits such as paralysis which may be permanent.  NATTY FERRERA Acting as a Scribe for Natty Mejias, attest that this documentation has been prepared under the direction and in the presence of Provider Jerald Bustamante MD.

## 2024-08-29 NOTE — PHYSICAL EXAM
[Normal Coordination] : normal coordination [Normal DTR UE/LE] : normal DTR UE/LE  [Normal Sensation] : normal sensation [Normal Mood and Affect] : normal mood and affect [Oriented] : oriented [Able to Communicate] : able to communicate [Normal Skin] : normal skin [No Rash] : no rash [No Ulcers] : no ulcers [No Lesions] : no lesions [No obvious lymphadenopathy in areas examined] : no obvious lymphadenopathy in areas examined [Well Developed] : well developed [Peripheral vascular exam is grossly normal] : peripheral vascular exam is grossly normal [No Respiratory Distress] : no respiratory distress [Flexion] : flexion [Extension] : extension [Left] : left hand [de-identified] : Constitutional:\par  - General Appearance:\par  Unremarkable\par  Body Habitus\par  Well Developed\par  Well Nourished\par  Body Habitus\par  No Deformities\par  Well Groomed\par  Ability To communicate:\par  Normal\par  Neurologic:\par  Global sensation is intact to upper and lower extremities. See examination of Neck and/or Spine\par  for exceptions.\par  Orientation to Time, Place and Person is: Normal\par  Mood And Affect is Normal\par  Skin:\par  - Head/Face, Right Upper/Lower Extremity, Left Upper/Lower Extremity: Normal\par  See Examination of Neck and/or Spine for exceptions\par  Cardiovascular:\par  Peripheral Cardiovascular System is Normal\par  Palpation of Lymph Nodes:\par  Normal Palpation of lymph nodes in: Axilla, Cervical, Inguinal\par  Abnormal Palpation of lymph nodes in: None  [] : no tenderness over wrist [FreeTextEntry9] : Pain with supination

## 2024-08-29 NOTE — PHYSICAL EXAM
[Normal Coordination] : normal coordination [Normal DTR UE/LE] : normal DTR UE/LE  [Normal Sensation] : normal sensation [Normal Mood and Affect] : normal mood and affect [Oriented] : oriented [Able to Communicate] : able to communicate [Normal Skin] : normal skin [No Rash] : no rash [No Ulcers] : no ulcers [No Lesions] : no lesions [No obvious lymphadenopathy in areas examined] : no obvious lymphadenopathy in areas examined [Well Developed] : well developed [Peripheral vascular exam is grossly normal] : peripheral vascular exam is grossly normal [No Respiratory Distress] : no respiratory distress [Flexion] : flexion [Extension] : extension [Left] : left hand [de-identified] : Constitutional:\par  - General Appearance:\par  Unremarkable\par  Body Habitus\par  Well Developed\par  Well Nourished\par  Body Habitus\par  No Deformities\par  Well Groomed\par  Ability To communicate:\par  Normal\par  Neurologic:\par  Global sensation is intact to upper and lower extremities. See examination of Neck and/or Spine\par  for exceptions.\par  Orientation to Time, Place and Person is: Normal\par  Mood And Affect is Normal\par  Skin:\par  - Head/Face, Right Upper/Lower Extremity, Left Upper/Lower Extremity: Normal\par  See Examination of Neck and/or Spine for exceptions\par  Cardiovascular:\par  Peripheral Cardiovascular System is Normal\par  Palpation of Lymph Nodes:\par  Normal Palpation of lymph nodes in: Axilla, Cervical, Inguinal\par  Abnormal Palpation of lymph nodes in: None  [] : no tenderness over wrist [FreeTextEntry9] : Pain with supination

## 2024-11-15 ENCOUNTER — APPOINTMENT (OUTPATIENT)
Dept: ORTHOPEDIC SURGERY | Facility: CLINIC | Age: 69
End: 2024-11-15
Payer: MEDICARE

## 2024-11-15 DIAGNOSIS — M54.12 RADICULOPATHY, CERVICAL REGION: ICD-10-CM

## 2024-11-15 DIAGNOSIS — M43.16 SPONDYLOLISTHESIS, LUMBAR REGION: ICD-10-CM

## 2024-11-15 PROCEDURE — 99214 OFFICE O/P EST MOD 30 MIN: CPT

## 2024-12-03 ENCOUNTER — RX ONLY (RX ONLY)
Age: 69
End: 2024-12-03

## 2024-12-03 ENCOUNTER — OFFICE (OUTPATIENT)
Dept: URBAN - METROPOLITAN AREA CLINIC 12 | Facility: CLINIC | Age: 69
Setting detail: OPHTHALMOLOGY
End: 2024-12-03

## 2024-12-03 ENCOUNTER — OFFICE (OUTPATIENT)
Dept: URBAN - METROPOLITAN AREA CLINIC 12 | Facility: CLINIC | Age: 69
Setting detail: OPHTHALMOLOGY
End: 2024-12-03
Payer: MEDICARE

## 2024-12-03 DIAGNOSIS — H35.372: ICD-10-CM

## 2024-12-03 DIAGNOSIS — H43.393: ICD-10-CM

## 2024-12-03 DIAGNOSIS — H04.123: ICD-10-CM

## 2024-12-03 DIAGNOSIS — H25.13: ICD-10-CM

## 2024-12-03 DIAGNOSIS — H90.3: ICD-10-CM

## 2024-12-03 DIAGNOSIS — H35.363: ICD-10-CM

## 2024-12-03 PROCEDURE — 92014 COMPRE OPH EXAM EST PT 1/>: CPT | Performed by: OPHTHALMOLOGY

## 2024-12-03 PROCEDURE — CANCEL CANCEL

## 2024-12-03 PROCEDURE — 92134 CPTRZ OPH DX IMG PST SGM RTA: CPT | Performed by: OPHTHALMOLOGY

## 2024-12-03 ASSESSMENT — TONOMETRY
OD_IOP_MMHG: 12
OS_IOP_MMHG: 13

## 2024-12-03 ASSESSMENT — REFRACTION_MANIFEST
OS_SPHERE: -0.75
OD_CYLINDER: -0.75
OS_AXIS: 110
OD_AXIS: 080
OS_VA1: 20/20-2
OU_VA: 20/20
OD_VA1: 20/20-
OS_CYLINDER: -0.75
OD_SPHERE: -0.75

## 2024-12-03 ASSESSMENT — REFRACTION_AUTOREFRACTION
OS_CYLINDER: -1.00
OD_SPHERE: -0.50
OS_AXIS: 103
OS_SPHERE: PLANO
OD_CYLINDER: -0.75
OD_AXIS: 070

## 2024-12-03 ASSESSMENT — REFRACTION_CURRENTRX
OS_OVR_VA: 20/
OS_SPHERE: -1.00
OS_CYLINDER: -0.75
OS_ADD: +3.25
OD_VPRISM_DIRECTION: PROGS
OD_CYLINDER: -0.75
OD_ADD: +3.25
OD_SPHERE: -1.25
OS_VPRISM_DIRECTION: PROGS
OS_AXIS: 116
OD_AXIS: 081
OD_OVR_VA: 20/

## 2024-12-03 ASSESSMENT — CONFRONTATIONAL VISUAL FIELD TEST (CVF)
OS_FINDINGS: FULL
OD_FINDINGS: FULL

## 2024-12-03 ASSESSMENT — KERATOMETRY
OS_AXISANGLE_DEGREES: 054
OD_K2POWER_DIOPTERS: 43.00
OD_K1POWER_DIOPTERS: 42.50
OS_K2POWER_DIOPTERS: 43.00
OD_AXISANGLE_DEGREES: 104
OS_K1POWER_DIOPTERS: 42.75

## 2024-12-03 ASSESSMENT — VISUAL ACUITY
OD_BCVA: 20/30+2
OS_BCVA: 20/25+2

## 2024-12-03 ASSESSMENT — SUPERFICIAL PUNCTATE KERATITIS (SPK)
OS_SPK: 1+
OD_SPK: 1+

## 2024-12-19 ENCOUNTER — OFFICE (OUTPATIENT)
Dept: URBAN - METROPOLITAN AREA CLINIC 12 | Facility: CLINIC | Age: 69
Setting detail: OPHTHALMOLOGY
End: 2024-12-19
Payer: MEDICARE

## 2024-12-19 DIAGNOSIS — H35.363: ICD-10-CM

## 2024-12-19 DIAGNOSIS — H35.372: ICD-10-CM

## 2024-12-19 DIAGNOSIS — H04.123: ICD-10-CM

## 2024-12-19 DIAGNOSIS — H43.393: ICD-10-CM

## 2024-12-19 DIAGNOSIS — H25.13: ICD-10-CM

## 2024-12-19 PROCEDURE — 92012 INTRM OPH EXAM EST PATIENT: CPT | Performed by: OPHTHALMOLOGY

## 2024-12-19 ASSESSMENT — REFRACTION_AUTOREFRACTION
OD_CYLINDER: -0.75
OD_AXIS: 061
OD_SPHERE: -0.50
OS_CYLINDER: -1.25
OS_AXIS: 103
OS_SPHERE: PLANO

## 2024-12-19 ASSESSMENT — REFRACTION_CURRENTRX
OS_AXIS: 116
OD_SPHERE: -1.25
OS_OVR_VA: 20/
OD_OVR_VA: 20/
OD_AXIS: 081
OS_CYLINDER: -0.75
OD_CYLINDER: -0.75
OS_ADD: +3.25
OD_ADD: +3.25
OD_VPRISM_DIRECTION: PROGS
OS_SPHERE: -1.00
OS_VPRISM_DIRECTION: PROGS

## 2024-12-19 ASSESSMENT — KERATOMETRY
OS_AXISANGLE_DEGREES: 049
OD_AXISANGLE_DEGREES: 109
OD_K2POWER_DIOPTERS: 43.00
OS_K2POWER_DIOPTERS: 43.50
OS_K1POWER_DIOPTERS: 42.75
METHOD_AUTO_MANUAL: AUTO
OD_K1POWER_DIOPTERS: 42.50

## 2024-12-19 ASSESSMENT — REFRACTION_MANIFEST
OD_AXIS: 080
OD_SPHERE: -0.75
OS_SPHERE: -0.75
OD_VA1: 20/20-
OS_CYLINDER: -0.75
OU_VA: 20/20
OD_CYLINDER: -0.75
OS_AXIS: 110
OS_VA1: 20/20-2

## 2024-12-19 ASSESSMENT — TONOMETRY
OS_IOP_MMHG: 10
OD_IOP_MMHG: 11

## 2024-12-19 ASSESSMENT — CONFRONTATIONAL VISUAL FIELD TEST (CVF)
OS_FINDINGS: FULL
OD_FINDINGS: FULL

## 2024-12-19 ASSESSMENT — VISUAL ACUITY
OD_BCVA: 20/30-3
OS_BCVA: 20/25+1

## 2024-12-19 ASSESSMENT — SUPERFICIAL PUNCTATE KERATITIS (SPK)
OD_SPK: 1+
OS_SPK: 1+

## 2025-02-12 ENCOUNTER — APPOINTMENT (OUTPATIENT)
Dept: ORTHOPEDIC SURGERY | Facility: CLINIC | Age: 70
End: 2025-02-12
Payer: MEDICARE

## 2025-02-12 VITALS — BODY MASS INDEX: 24.8 KG/M2 | WEIGHT: 140 LBS | HEIGHT: 63 IN

## 2025-02-12 DIAGNOSIS — M54.12 RADICULOPATHY, CERVICAL REGION: ICD-10-CM

## 2025-02-12 DIAGNOSIS — M79.10 MYALGIA, UNSPECIFIED SITE: ICD-10-CM

## 2025-02-12 DIAGNOSIS — M43.16 SPONDYLOLISTHESIS, LUMBAR REGION: ICD-10-CM

## 2025-02-12 DIAGNOSIS — M25.562 PAIN IN LEFT KNEE: ICD-10-CM

## 2025-02-12 DIAGNOSIS — G89.29 PAIN IN LEFT KNEE: ICD-10-CM

## 2025-02-12 PROCEDURE — 99214 OFFICE O/P EST MOD 30 MIN: CPT | Mod: 25

## 2025-02-12 PROCEDURE — 20552 NJX 1/MLT TRIGGER POINT 1/2: CPT

## 2025-02-13 ENCOUNTER — APPOINTMENT (OUTPATIENT)
Dept: ORTHOPEDIC SURGERY | Facility: CLINIC | Age: 70
End: 2025-02-13

## 2025-02-16 PROBLEM — M79.10 MYALGIA, UNSPECIFIED SITE: Status: ACTIVE | Noted: 2025-02-16

## 2025-02-27 ENCOUNTER — APPOINTMENT (OUTPATIENT)
Dept: ORTHOPEDIC SURGERY | Facility: CLINIC | Age: 70
End: 2025-02-27
Payer: MEDICARE

## 2025-02-27 VITALS — HEIGHT: 63 IN | BODY MASS INDEX: 24.8 KG/M2 | WEIGHT: 140 LBS

## 2025-02-27 DIAGNOSIS — M81.0 AGE-RELATED OSTEOPOROSIS W/OUT CURRENT PATHOLOGICAL FRACTURE: ICD-10-CM

## 2025-02-27 DIAGNOSIS — S83.242A OTHER TEAR OF MEDIAL MENISCUS, CURRENT INJURY, LEFT KNEE, INITIAL ENCOUNTER: ICD-10-CM

## 2025-02-27 PROCEDURE — 99214 OFFICE O/P EST MOD 30 MIN: CPT

## 2025-05-14 ENCOUNTER — APPOINTMENT (OUTPATIENT)
Dept: ORTHOPEDIC SURGERY | Facility: CLINIC | Age: 70
End: 2025-05-14
Payer: MEDICARE

## 2025-05-14 DIAGNOSIS — M54.16 RADICULOPATHY, LUMBAR REGION: ICD-10-CM

## 2025-05-14 DIAGNOSIS — M54.12 RADICULOPATHY, CERVICAL REGION: ICD-10-CM

## 2025-05-14 DIAGNOSIS — M43.16 SPONDYLOLISTHESIS, LUMBAR REGION: ICD-10-CM

## 2025-05-14 PROCEDURE — 99214 OFFICE O/P EST MOD 30 MIN: CPT

## 2025-06-03 ENCOUNTER — OFFICE (OUTPATIENT)
Dept: URBAN - METROPOLITAN AREA CLINIC 12 | Facility: CLINIC | Age: 70
Setting detail: OPHTHALMOLOGY
End: 2025-06-03
Payer: MEDICARE

## 2025-06-03 DIAGNOSIS — H35.363: ICD-10-CM

## 2025-06-03 DIAGNOSIS — H35.373: ICD-10-CM

## 2025-06-03 DIAGNOSIS — H43.393: ICD-10-CM

## 2025-06-03 DIAGNOSIS — H25.13: ICD-10-CM

## 2025-06-03 DIAGNOSIS — H04.123: ICD-10-CM

## 2025-06-03 PROCEDURE — 92014 COMPRE OPH EXAM EST PT 1/>: CPT | Performed by: OPHTHALMOLOGY

## 2025-06-03 PROCEDURE — 92134 CPTRZ OPH DX IMG PST SGM RTA: CPT | Performed by: OPHTHALMOLOGY

## 2025-06-03 ASSESSMENT — REFRACTION_MANIFEST
OS_AXIS: 110
OD_AXIS: 070
OS_VA1: 20/20-2
OD_ADD: +2.50
OD_VA1: 20/20-
OD_CYLINDER: -0.50
OS_SPHERE: -0.50
OD_SPHERE: -0.75
OS_ADD: +2.50
OS_CYLINDER: -0.75
OU_VA: 20/20

## 2025-06-03 ASSESSMENT — VISUAL ACUITY
OD_BCVA: 20/25-2
OS_BCVA: 20/25-1

## 2025-06-03 ASSESSMENT — REFRACTION_CURRENTRX
OD_SPHERE: -1.25
OD_VPRISM_DIRECTION: PROGS
OS_ADD: +3.25
OS_CYLINDER: -0.75
OD_CYLINDER: -0.75
OS_OVR_VA: 20/
OD_ADD: +3.25
OD_AXIS: 083
OS_AXIS: 117
OS_SPHERE: -1.00
OS_VPRISM_DIRECTION: PROGS
OD_OVR_VA: 20/

## 2025-06-03 ASSESSMENT — TONOMETRY
OS_IOP_MMHG: 13
OD_IOP_MMHG: 15

## 2025-06-03 ASSESSMENT — REFRACTION_AUTOREFRACTION
OS_CYLINDER: -1.00
OS_AXIS: 109
OD_AXIS: 062
OS_SPHERE: PLANO
OD_CYLINDER: -0.75
OD_SPHERE: -0.25

## 2025-06-03 ASSESSMENT — KERATOMETRY
METHOD_AUTO_MANUAL: AUTO
OD_AXISANGLE_DEGREES: 164
OS_K2POWER_DIOPTERS: 43.00
OD_K1POWER_DIOPTERS: 42.50
OS_AXISANGLE_DEGREES: 033
OD_K2POWER_DIOPTERS: 43.00
OS_K1POWER_DIOPTERS: 42.75

## 2025-06-03 ASSESSMENT — CONFRONTATIONAL VISUAL FIELD TEST (CVF)
OD_FINDINGS: FULL
OS_FINDINGS: FULL

## 2025-06-03 ASSESSMENT — SUPERFICIAL PUNCTATE KERATITIS (SPK)
OD_SPK: 1+
OS_SPK: 1+

## 2025-06-12 ENCOUNTER — APPOINTMENT (OUTPATIENT)
Dept: PAIN MANAGEMENT | Facility: CLINIC | Age: 70
End: 2025-06-12
Payer: MEDICARE

## 2025-06-12 VITALS — WEIGHT: 135 LBS | HEIGHT: 63 IN | BODY MASS INDEX: 23.92 KG/M2

## 2025-06-12 PROBLEM — Z98.1 HISTORY OF FUSION OF CERVICAL SPINE: Status: ACTIVE | Noted: 2025-06-12

## 2025-06-12 PROBLEM — H81.10 VERTIGO, BENIGN POSITIONAL: Status: ACTIVE | Noted: 2025-06-12

## 2025-06-12 PROBLEM — M48.02 STENOSIS, CERVICAL SPINE: Status: ACTIVE | Noted: 2025-06-12

## 2025-06-12 PROCEDURE — 99204 OFFICE O/P NEW MOD 45 MIN: CPT

## 2025-08-06 ENCOUNTER — APPOINTMENT (OUTPATIENT)
Dept: ORTHOPEDIC SURGERY | Facility: CLINIC | Age: 70
End: 2025-08-06
Payer: MEDICARE

## 2025-08-06 VITALS — HEIGHT: 63 IN | BODY MASS INDEX: 23.92 KG/M2 | WEIGHT: 135 LBS

## 2025-08-06 DIAGNOSIS — M54.12 RADICULOPATHY, CERVICAL REGION: ICD-10-CM

## 2025-08-06 PROCEDURE — 99214 OFFICE O/P EST MOD 30 MIN: CPT

## 2025-08-06 RX ORDER — METHOCARBAMOL 750 MG/1
750 TABLET, FILM COATED ORAL
Qty: 30 | Refills: 1 | Status: ACTIVE | COMMUNITY
Start: 2025-08-06 | End: 1900-01-01